# Patient Record
Sex: MALE | Race: WHITE | NOT HISPANIC OR LATINO
[De-identification: names, ages, dates, MRNs, and addresses within clinical notes are randomized per-mention and may not be internally consistent; named-entity substitution may affect disease eponyms.]

---

## 2024-09-12 ENCOUNTER — TRANSCRIPTION ENCOUNTER (OUTPATIENT)
Age: 74
End: 2024-09-12

## 2024-09-12 ENCOUNTER — RESULT REVIEW (OUTPATIENT)
Age: 74
End: 2024-09-12

## 2024-09-13 ENCOUNTER — TRANSCRIPTION ENCOUNTER (OUTPATIENT)
Age: 74
End: 2024-09-13

## 2024-09-13 ENCOUNTER — INPATIENT (INPATIENT)
Facility: HOSPITAL | Age: 74
LOS: 3 days | Discharge: HOME | End: 2024-09-17
Attending: STUDENT IN AN ORGANIZED HEALTH CARE EDUCATION/TRAINING PROGRAM | Admitting: STUDENT IN AN ORGANIZED HEALTH CARE EDUCATION/TRAINING PROGRAM
Payer: COMMERCIAL

## 2024-09-13 VITALS
OXYGEN SATURATION: 98 % | DIASTOLIC BLOOD PRESSURE: 84 MMHG | SYSTOLIC BLOOD PRESSURE: 126 MMHG | HEART RATE: 75 BPM | TEMPERATURE: 98 F | RESPIRATION RATE: 17 BRPM

## 2024-09-13 DIAGNOSIS — Z98.890 OTHER SPECIFIED POSTPROCEDURAL STATES: Chronic | ICD-10-CM

## 2024-09-13 DIAGNOSIS — I50.20 UNSPECIFIED SYSTOLIC (CONGESTIVE) HEART FAILURE: ICD-10-CM

## 2024-09-13 DIAGNOSIS — R74.8 ABNORMAL LEVELS OF OTHER SERUM ENZYMES: ICD-10-CM

## 2024-09-13 DIAGNOSIS — E78.5 HYPERLIPIDEMIA, UNSPECIFIED: ICD-10-CM

## 2024-09-13 DIAGNOSIS — N17.9 ACUTE KIDNEY FAILURE, UNSPECIFIED: ICD-10-CM

## 2024-09-13 DIAGNOSIS — R73.03 PREDIABETES: ICD-10-CM

## 2024-09-13 DIAGNOSIS — I10 ESSENTIAL (PRIMARY) HYPERTENSION: ICD-10-CM

## 2024-09-13 DIAGNOSIS — I25.10 ATHEROSCLEROTIC HEART DISEASE OF NATIVE CORONARY ARTERY WITHOUT ANGINA PECTORIS: ICD-10-CM

## 2024-09-13 LAB
ALBUMIN SERPL ELPH-MCNC: 4.6 G/DL — SIGNIFICANT CHANGE UP (ref 3.3–5)
ALP SERPL-CCNC: 69 U/L — SIGNIFICANT CHANGE UP (ref 40–120)
ALT FLD-CCNC: 137 U/L — HIGH (ref 10–45)
ANION GAP SERPL CALC-SCNC: 12 MMOL/L — SIGNIFICANT CHANGE UP (ref 5–17)
APTT BLD: 28.7 SEC — SIGNIFICANT CHANGE UP (ref 24.5–35.6)
AST SERPL-CCNC: 282 U/L — HIGH (ref 10–40)
BILIRUB SERPL-MCNC: 0.8 MG/DL — SIGNIFICANT CHANGE UP (ref 0.2–1.2)
BUN SERPL-MCNC: 28 MG/DL — HIGH (ref 7–23)
CALCIUM SERPL-MCNC: 10.1 MG/DL — SIGNIFICANT CHANGE UP (ref 8.4–10.5)
CHLORIDE SERPL-SCNC: 100 MMOL/L — SIGNIFICANT CHANGE UP (ref 96–108)
CO2 SERPL-SCNC: 27 MMOL/L — SIGNIFICANT CHANGE UP (ref 22–31)
CREAT SERPL-MCNC: 1.36 MG/DL — HIGH (ref 0.5–1.3)
EGFR: 55 ML/MIN/1.73M2 — LOW
GLUCOSE SERPL-MCNC: 119 MG/DL — HIGH (ref 70–99)
HCT VFR BLD CALC: 46.7 % — SIGNIFICANT CHANGE UP (ref 39–50)
HGB BLD-MCNC: 15.4 G/DL — SIGNIFICANT CHANGE UP (ref 13–17)
INR BLD: 0.97 — SIGNIFICANT CHANGE UP (ref 0.85–1.18)
LACTATE SERPL-SCNC: 1.7 MMOL/L — SIGNIFICANT CHANGE UP (ref 0.5–2)
MAGNESIUM SERPL-MCNC: 2.1 MG/DL — SIGNIFICANT CHANGE UP (ref 1.6–2.6)
MCHC RBC-ENTMCNC: 29.7 PG — SIGNIFICANT CHANGE UP (ref 27–34)
MCHC RBC-ENTMCNC: 33 GM/DL — SIGNIFICANT CHANGE UP (ref 32–36)
MCV RBC AUTO: 90 FL — SIGNIFICANT CHANGE UP (ref 80–100)
NRBC # BLD: 0 /100 WBCS — SIGNIFICANT CHANGE UP (ref 0–0)
PLATELET # BLD AUTO: 204 K/UL — SIGNIFICANT CHANGE UP (ref 150–400)
POTASSIUM SERPL-MCNC: 4 MMOL/L — SIGNIFICANT CHANGE UP (ref 3.5–5.3)
POTASSIUM SERPL-SCNC: 4 MMOL/L — SIGNIFICANT CHANGE UP (ref 3.5–5.3)
PROT SERPL-MCNC: 7.8 G/DL — SIGNIFICANT CHANGE UP (ref 6–8.3)
PROTHROM AB SERPL-ACNC: 11.1 SEC — SIGNIFICANT CHANGE UP (ref 9.5–13)
RBC # BLD: 5.19 M/UL — SIGNIFICANT CHANGE UP (ref 4.2–5.8)
RBC # FLD: 13.2 % — SIGNIFICANT CHANGE UP (ref 10.3–14.5)
SODIUM SERPL-SCNC: 139 MMOL/L — SIGNIFICANT CHANGE UP (ref 135–145)
WBC # BLD: 10.63 K/UL — HIGH (ref 3.8–10.5)
WBC # FLD AUTO: 10.63 K/UL — HIGH (ref 3.8–10.5)

## 2024-09-13 RX ORDER — ASPIRIN 81 MG
81 TABLET, DELAYED RELEASE (ENTERIC COATED) ORAL DAILY
Refills: 0 | Status: DISCONTINUED | OUTPATIENT
Start: 2024-09-13 | End: 2024-09-17

## 2024-09-13 RX ORDER — DAPAGLIFLOZIN 10 MG/1
1 TABLET, FILM COATED ORAL
Refills: 0 | DISCHARGE

## 2024-09-13 RX ORDER — METOPROLOL TARTRATE 100 MG/1
50 TABLET ORAL
Refills: 0 | Status: DISCONTINUED | OUTPATIENT
Start: 2024-09-13 | End: 2024-09-14

## 2024-09-13 RX ADMIN — METOPROLOL TARTRATE 50 MILLIGRAM(S): 100 TABLET ORAL at 22:35

## 2024-09-13 NOTE — H&P ADULT - PROBLEM SELECTOR PLAN 6
Hold Lipitor in setting of transaminitis     F: None  E: Replete if K<4 or Mag<2  N: DASH Diet  VTEppx: Start heparin gtt in AM   Dispo: Clinically active

## 2024-09-13 NOTE — H&P ADULT - PROBLEM SELECTOR PROBLEM 3
Elevated liver enzymes [place] Pregnancy And Lactation Text: This medication is Pregnancy Category X and contraindicated in pregnancy and in women who may become pregnant. It is unknown if this medication is excreted in breast milk.

## 2024-09-13 NOTE — H&P ADULT - NSICDXPASTMEDICALHX_GEN_ALL_CORE_FT
PAST MEDICAL HISTORY:  CAD (coronary artery disease)     Chronic HFrEF (heart failure with reduced ejection fraction)     HLD (hyperlipidemia)     HTN (hypertension)

## 2024-09-13 NOTE — H&P ADULT - PROBLEM/PLAN-3
Pt called stating she thought she was going to get an inhaler sent to the pharmacy    Also, she stated she was looking for the referral to sleep medicine for  A sleep apnea test  DISPLAY PLAN FREE TEXT

## 2024-09-13 NOTE — H&P ADULT - NSHPLABSRESULTS_GEN_ALL_CORE
15.4   10.63 )-----------( 204      ( 13 Sep 2024 21:31 )             46.7       09-13    139  |  100  |  x   ----------------------------<  119<H>  4.0   |  27  |  x     Ca    10.1      13 Sep 2024 21:31  Mg     2.1     09-13        PT/INR - ( 13 Sep 2024 21:32 )   PT: 11.1 sec;   INR: 0.97          PTT - ( 13 Sep 2024 21:32 )  PTT:28.7 sec          Urinalysis Basic - ( 13 Sep 2024 21:31 )    Color: x / Appearance: x / SG: x / pH: x  Gluc: 119 mg/dL / Ketone: x  / Bili: x / Urobili: x   Blood: x / Protein: x / Nitrite: x   Leuk Esterase: x / RBC: x / WBC x   Sq Epi: x / Non Sq Epi: x / Bacteria: x        ECG NSR @ 75 bpm w/ PVCs and RBBB

## 2024-09-13 NOTE — PATIENT PROFILE ADULT - VISION (WITH CORRECTIVE LENSES IF THE PATIENT USUALLY WEARS THEM):
recent cataract surgery from January/Normal vision: sees adequately in most situations; can see medication labels, newsprint

## 2024-09-13 NOTE — H&P ADULT - WEIGHT IN LBS
LOC: The patient is awake, alert, and oriented to self, place, time, and situation. Pt is calm and cooperative. Affect is appropriate.  Speech is appropriate and clear.     APPEARANCE: Patient resting comfortably in no acute distress.  Patient is clean and well groomed.    SKIN: The skin is warm and dry; color consistent with ethnicity.  Patient has normal skin turgor and moist mucus membranes. Wound noted to L shin, redness noted, no warmth noted, no swelling noted.     MUSCULOSKELETAL: Patient moving upper and lower extremities without difficulty; denies pain in the extremities or back.  Denies weakness.     RESPIRATORY: Airway is open and patent. Respirations spontaneous, even, easy, and non-labored.  Patient has a normal effort and rate.  No accessory muscle use noted. Denies cough.     CARDIAC:  Normal rate noted.  No peripheral edema noted. No complaints of chest pain.     ABDOMEN: Soft and non tender to palpation.  No distention noted. Pt denies abdominal pain; denies nausea, vomiting, diarrhea, or constipation.    NEUROLOGIC: Eyes open spontaneously.  Behavior appropriate to situation.  Follows commands; facial expression symmetrical.  Purposeful motor response noted; normal sensation in all extremities. Pt denies headache; denies lightheadedness or dizziness; denies visual disturbances; denies loss of balance; denies unilateral weakness.       198.8 Negative

## 2024-09-13 NOTE — H&P ADULT - HISTORY OF PRESENT ILLNESS
73yoM PMHx HTN and FamHx MI/HF (mother) and PAD(father) admitted to [facility] 9/12/24 for SOB and found to be AHRF 2/2 Acute CHF and HTN emergency w/ course c/b by troponemia and r/i NSTEMI resulting in tx to [facility] 9/12/24 for cardiac cath that revealed severe 3vCAD w/ subsequent tx to [facility] for CABG vs complex PCI vs medical management.     @ [place] on 9/12/24 he presented with sudden onset of progressively worsening severe left sided "gurgling" in the chest associated with dyspnea. No similar prior episodes. BIB EMS. On CXR bilateral vascular congestion. He met criteria on arrival there for htn emergency. He was placed on bipap for associated AHRF. He was managed medically with initiation of several cardiac medications including iv lasix, nitro drip, brillinta load, aspirin, high intensity statin, beta blocker, sglt2, hep drip. Troponin steadily began rising and reached peak in the 700s. Then was transferred him to [facility] for further cardiac intervention. @ [facility] 9/12/24 s/p cardiac cath that revealed severe 3vCAD. Currently denies CP, SOB, n/v/d, fever, chills, palpitations, dizziness, lightheadedness, abd pain, headache or diaphoresis. Pt does not have a cardiologist.     On admission   T 97.6, /84, HR 75, O2 98% on RA   WBC 1063, H/H 15.4/46.7, Lactate 1.7, Cr 1.36, ,   ECG NSR @ 75 bpm w/ PVCs and RBBB

## 2024-09-13 NOTE — H&P ADULT - PROBLEM SELECTOR PLAN 2
Warm, euvolemic on exam  - Lactate 1.7, ->282, ->137  - Etiology: ICM   - GDMT: C/w Lopressor 50 BID   - TTE (9/12/24): LVEF 17%, LV mod dilated, apical akinesis rest is severely hypokinetic, no LV thrombus, G3DD, mildly reduced RVSF, LA mildly dilated, mild AR, mod-severe MR    Plan  - Advanced HF consult (Ishmael aware of pt)  - Holding Farxiga

## 2024-09-13 NOTE — PATIENT PROFILE ADULT - STATED REASON FOR ADMISSION
[facility] scheduled procedure for cardiac cath, transferred today for further work up

## 2024-09-13 NOTE — H&P ADULT - PROBLEM SELECTOR PLAN 1
Currently CP free, VSS  - S/p LHC at [facility] (9/13/24): Diffuse 3vCAD (100%mLAD, 100% D1, 100% D2, 90% OM2, 100% mRCA receives retrograde collaterals), LVEDP 35 mmHg. No AS. right femoral access.  - Trop at [facility] [phone]  - Loaded with Aspirin 325mg PO x 1, Ticagrelor 300mg PO X 1 at [place] 9/12/24  - ECG NSR @ 75 bpm w/ PVCs and RBBB    Plan  - Consult CTS for CABG vs high risk PCI vs medical management   - C/w aspirin 81mg  - Start heparin gtt in AM   - Hold Brilinta pending CTS decision and lipitor in setting of transaminitis

## 2024-09-13 NOTE — PATIENT PROFILE ADULT - FALL HARM RISK - HARM RISK INTERVENTIONS
Assistance with ambulation/Assistance OOB with selected safe patient handling equipment/Communicate Risk of Fall with Harm to all staff/Monitor gait and stability/Reinforce activity limits and safety measures with patient and family/Sit up slowly, dangle for a short time, stand at bedside before walking/Tailored Fall Risk Interventions/Toileting schedule using arm’s reach rule for commode and bathroom/Use of alarms - bed, chair and/or voice tab/Visual Cue: Yellow wristband and red socks/Bed in lowest position, wheels locked, appropriate side rails in place/Call bell, personal items and telephone in reach/Instruct patient to call for assistance before getting out of bed or chair/Non-slip footwear when patient is out of bed/[place] to call system/Physically safe environment - no spills, clutter or unnecessary equipment/Purposeful Proactive Rounding/Room/bathroom lighting operational, light cord in reach

## 2024-09-13 NOTE — H&P ADULT - ASSESSMENT
73yoM PMHx HTN and FamHx MI/HF (mother) and PAD(father) admitted to [facility] 9/12/24 for SOB and found to be AHRF 2/2 Acute CHF and HTN emergency w/ course c/b by troponemia and r/i NSTEMI resulting in tx to [facility] 9/12/24 for cardiac cath that revealed severe 3vCAD. Pt now transferred to [facility] for CABG vs complex PCI vs medical management.

## 2024-09-14 ENCOUNTER — RESULT REVIEW (OUTPATIENT)
Age: 74
End: 2024-09-14

## 2024-09-14 DIAGNOSIS — I21.4 NON-ST ELEVATION (NSTEMI) MYOCARDIAL INFARCTION: ICD-10-CM

## 2024-09-14 LAB
A1C WITH ESTIMATED AVERAGE GLUCOSE RESULT: 5.8 % — HIGH (ref 4–5.6)
ADD ON TEST-SPECIMEN IN LAB: SIGNIFICANT CHANGE UP
ALBUMIN SERPL ELPH-MCNC: 4.4 G/DL — SIGNIFICANT CHANGE UP (ref 3.3–5)
ALP SERPL-CCNC: 63 U/L — SIGNIFICANT CHANGE UP (ref 40–120)
ALT FLD-CCNC: 115 U/L — HIGH (ref 10–45)
ANION GAP SERPL CALC-SCNC: 13 MMOL/L — SIGNIFICANT CHANGE UP (ref 5–17)
APTT BLD: 65.2 SEC — HIGH (ref 24.5–35.6)
APTT BLD: 92.1 SEC — HIGH (ref 24.5–35.6)
AST SERPL-CCNC: 205 U/L — HIGH (ref 10–40)
BILIRUB SERPL-MCNC: 1.1 MG/DL — SIGNIFICANT CHANGE UP (ref 0.2–1.2)
BUN SERPL-MCNC: 32 MG/DL — HIGH (ref 7–23)
CALCIUM SERPL-MCNC: 10 MG/DL — SIGNIFICANT CHANGE UP (ref 8.4–10.5)
CHLORIDE SERPL-SCNC: 102 MMOL/L — SIGNIFICANT CHANGE UP (ref 96–108)
CHOLEST SERPL-MCNC: 233 MG/DL — HIGH
CK MB CFR SERPL CALC: 33.3 NG/ML — HIGH (ref 0–6.7)
CK SERPL-CCNC: 701 U/L — HIGH (ref 30–200)
CO2 SERPL-SCNC: 23 MMOL/L — SIGNIFICANT CHANGE UP (ref 22–31)
CREAT SERPL-MCNC: 1.14 MG/DL — SIGNIFICANT CHANGE UP (ref 0.5–1.3)
EGFR: 68 ML/MIN/1.73M2 — SIGNIFICANT CHANGE UP
ESTIMATED AVERAGE GLUCOSE: 120 MG/DL — HIGH (ref 68–114)
GLUCOSE SERPL-MCNC: 124 MG/DL — HIGH (ref 70–99)
HCT VFR BLD CALC: 45.4 % — SIGNIFICANT CHANGE UP (ref 39–50)
HCT VFR BLD CALC: 46.8 % — SIGNIFICANT CHANGE UP (ref 39–50)
HDLC SERPL-MCNC: 60 MG/DL — SIGNIFICANT CHANGE UP
HGB BLD-MCNC: 15.3 G/DL — SIGNIFICANT CHANGE UP (ref 13–17)
HGB BLD-MCNC: 15.5 G/DL — SIGNIFICANT CHANGE UP (ref 13–17)
LIPID PNL WITH DIRECT LDL SERPL: 142 MG/DL — HIGH
MAGNESIUM SERPL-MCNC: 2 MG/DL — SIGNIFICANT CHANGE UP (ref 1.6–2.6)
MCHC RBC-ENTMCNC: 30.3 PG — SIGNIFICANT CHANGE UP (ref 27–34)
MCHC RBC-ENTMCNC: 30.9 PG — SIGNIFICANT CHANGE UP (ref 27–34)
MCHC RBC-ENTMCNC: 32.7 GM/DL — SIGNIFICANT CHANGE UP (ref 32–36)
MCHC RBC-ENTMCNC: 34.1 GM/DL — SIGNIFICANT CHANGE UP (ref 32–36)
MCV RBC AUTO: 88.7 FL — SIGNIFICANT CHANGE UP (ref 80–100)
MCV RBC AUTO: 94.5 FL — SIGNIFICANT CHANGE UP (ref 80–100)
NON HDL CHOLESTEROL: 173 MG/DL — HIGH
NRBC # BLD: 0 /100 WBCS — SIGNIFICANT CHANGE UP (ref 0–0)
NRBC # BLD: 0 /100 WBCS — SIGNIFICANT CHANGE UP (ref 0–0)
NT-PROBNP SERPL-SCNC: 5047 PG/ML — HIGH (ref 0–300)
PLATELET # BLD AUTO: 184 K/UL — SIGNIFICANT CHANGE UP (ref 150–400)
PLATELET # BLD AUTO: 204 K/UL — SIGNIFICANT CHANGE UP (ref 150–400)
POTASSIUM SERPL-MCNC: 3.8 MMOL/L — SIGNIFICANT CHANGE UP (ref 3.5–5.3)
POTASSIUM SERPL-SCNC: 3.8 MMOL/L — SIGNIFICANT CHANGE UP (ref 3.5–5.3)
PROT SERPL-MCNC: 7.6 G/DL — SIGNIFICANT CHANGE UP (ref 6–8.3)
RBC # BLD: 4.95 M/UL — SIGNIFICANT CHANGE UP (ref 4.2–5.8)
RBC # BLD: 5.12 M/UL — SIGNIFICANT CHANGE UP (ref 4.2–5.8)
RBC # FLD: 13.1 % — SIGNIFICANT CHANGE UP (ref 10.3–14.5)
RBC # FLD: 13.2 % — SIGNIFICANT CHANGE UP (ref 10.3–14.5)
SODIUM SERPL-SCNC: 138 MMOL/L — SIGNIFICANT CHANGE UP (ref 135–145)
TRIGL SERPL-MCNC: 173 MG/DL — HIGH
TROPONIN T, HIGH SENSITIVITY RESULT: 5999 NG/L — CRITICAL HIGH (ref 0–51)
WBC # BLD: 11.2 K/UL — HIGH (ref 3.8–10.5)
WBC # BLD: 9.99 K/UL — SIGNIFICANT CHANGE UP (ref 3.8–10.5)
WBC # FLD AUTO: 11.2 K/UL — HIGH (ref 3.8–10.5)
WBC # FLD AUTO: 9.99 K/UL — SIGNIFICANT CHANGE UP (ref 3.8–10.5)

## 2024-09-14 PROCEDURE — 99223 1ST HOSP IP/OBS HIGH 75: CPT

## 2024-09-14 PROCEDURE — 99233 SBSQ HOSP IP/OBS HIGH 50: CPT

## 2024-09-14 PROCEDURE — 71045 X-RAY EXAM CHEST 1 VIEW: CPT | Mod: 26

## 2024-09-14 PROCEDURE — 93306 TTE W/DOPPLER COMPLETE: CPT | Mod: 26

## 2024-09-14 RX ORDER — METOPROLOL TARTRATE 100 MG/1
50 TABLET ORAL DAILY
Refills: 0 | Status: DISCONTINUED | OUTPATIENT
Start: 2024-09-15 | End: 2024-09-16

## 2024-09-14 RX ORDER — TICAGRELOR 90 MG/1
90 TABLET ORAL EVERY 12 HOURS
Refills: 0 | Status: DISCONTINUED | OUTPATIENT
Start: 2024-09-14 | End: 2024-09-17

## 2024-09-14 RX ORDER — FUROSEMIDE 40 MG
40 TABLET ORAL DAILY
Refills: 0 | Status: DISCONTINUED | OUTPATIENT
Start: 2024-09-14 | End: 2024-09-16

## 2024-09-14 RX ORDER — FUROSEMIDE 40 MG
40 TABLET ORAL DAILY
Refills: 0 | Status: DISCONTINUED | OUTPATIENT
Start: 2024-09-14 | End: 2024-09-14

## 2024-09-14 RX ORDER — HEPARIN SODIUM,BOVINE 1000/ML
7000 VIAL (ML) INJECTION EVERY 6 HOURS
Refills: 0 | Status: DISCONTINUED | OUTPATIENT
Start: 2024-09-14 | End: 2024-09-15

## 2024-09-14 RX ORDER — SPIRONOLACTONE 25 MG/1
25 TABLET, FILM COATED ORAL DAILY
Refills: 0 | Status: DISCONTINUED | OUTPATIENT
Start: 2024-09-14 | End: 2024-09-17

## 2024-09-14 RX ORDER — SACUBITRIL AND VALSARTAN 49; 51 MG/1; MG/1
1 TABLET, FILM COATED ORAL
Refills: 0 | Status: DISCONTINUED | OUTPATIENT
Start: 2024-09-14 | End: 2024-09-16

## 2024-09-14 RX ORDER — POTASSIUM CHLORIDE 10 MEQ
40 TABLET, EXT RELEASE, PARTICLES/CRYSTALS ORAL ONCE
Refills: 0 | Status: COMPLETED | OUTPATIENT
Start: 2024-09-14 | End: 2024-09-14

## 2024-09-14 RX ORDER — HEPARIN SODIUM,BOVINE 1000/ML
VIAL (ML) INJECTION
Qty: 25000 | Refills: 0 | Status: DISCONTINUED | OUTPATIENT
Start: 2024-09-14 | End: 2024-09-15

## 2024-09-14 RX ORDER — HEPARIN SODIUM,BOVINE 1000/ML
7000 VIAL (ML) INJECTION ONCE
Refills: 0 | Status: COMPLETED | OUTPATIENT
Start: 2024-09-14 | End: 2024-09-14

## 2024-09-14 RX ORDER — HEPARIN SODIUM,BOVINE 1000/ML
3500 VIAL (ML) INJECTION EVERY 6 HOURS
Refills: 0 | Status: DISCONTINUED | OUTPATIENT
Start: 2024-09-14 | End: 2024-09-15

## 2024-09-14 RX ADMIN — SACUBITRIL AND VALSARTAN 1 TABLET(S): 49; 51 TABLET, FILM COATED ORAL at 17:43

## 2024-09-14 RX ADMIN — Medication 5 MILLIGRAM(S): at 22:07

## 2024-09-14 RX ADMIN — Medication 1600 UNIT(S)/HR: at 08:13

## 2024-09-14 RX ADMIN — Medication 40 MILLIGRAM(S): at 11:52

## 2024-09-14 RX ADMIN — Medication 81 MILLIGRAM(S): at 11:52

## 2024-09-14 RX ADMIN — Medication 5 MILLIGRAM(S): at 01:55

## 2024-09-14 RX ADMIN — Medication 1600 UNIT(S)/HR: at 07:38

## 2024-09-14 RX ADMIN — Medication 1600 UNIT(S)/HR: at 14:31

## 2024-09-14 RX ADMIN — Medication 7000 UNIT(S): at 07:38

## 2024-09-14 RX ADMIN — Medication 1600 UNIT(S)/HR: at 21:10

## 2024-09-14 RX ADMIN — SPIRONOLACTONE 25 MILLIGRAM(S): 25 TABLET, FILM COATED ORAL at 11:53

## 2024-09-14 RX ADMIN — TICAGRELOR 90 MILLIGRAM(S): 90 TABLET ORAL at 11:11

## 2024-09-14 RX ADMIN — Medication 40 MILLIEQUIVALENT(S): at 11:52

## 2024-09-14 RX ADMIN — TICAGRELOR 90 MILLIGRAM(S): 90 TABLET ORAL at 22:07

## 2024-09-14 RX ADMIN — METOPROLOL TARTRATE 50 MILLIGRAM(S): 100 TABLET ORAL at 06:15

## 2024-09-14 RX ADMIN — Medication 1600 UNIT(S)/HR: at 20:07

## 2024-09-14 NOTE — PROVIDER CONTACT NOTE (CRITICAL VALUE NOTIFICATION) - BACKGROUND
Patient had NSTEMI, s/p cardiac cath, revealed severe triple vessel CAD, also has HF. Has Heparin gtt 16ml/hr (therapeutic APTT)

## 2024-09-14 NOTE — CONSULT NOTE ADULT - SUBJECTIVE AND OBJECTIVE BOX
HPI: 73yoM PMHx HTN and FamHx MI/HF (mother) and PAD(father) admitted to [facility] 9/12/24 for SOB and found to be AHRF 2/2 Acute CHF and HTN emergency w/ course c/b by troponemia and r/i NSTEMI resulting in tx to [facility] 9/12/24 for cardiac cath that revealed severe 3vCAD w/ subsequent tx to [facility] for CABG vs complex PCI vs medical management.   @ [place] on 9/12/24 he presented with sudden onset of progressively worsening severe left sided "gurgling" in the chest associated with dyspnea. No similar prior episodes. BIB EMS. On CXR bilateral vascular congestion. He met criteria on arrival there for htn emergency. He was placed on bipap for associated AHRF. He was managed medically with initiation of several cardiac medications including iv lasix, nitro drip, brillinta load, aspirin, high intensity statin, beta blocker, sglt2, hep drip. Troponin steadily began rising and reached peak in the 700s. Then was transferred him to [facility] for further cardiac intervention. @ [facility] 9/12/24 s/p cardiac cath that revealed severe 3vCAD, no interventions performed and patient transferred to [facility] for CABG vs complex PCI vs medical management. Per attending to attending d/w CTS Dr. Almonte, patient deemed not a surgical candidate as he has no appropriate targets. TTE done showing severely reduced LVSF with EF 15-20% for which heart failure consulted.    SUBJECTIVE / INTERVAL HPI: Patient seen and examined at bedside. Reports he is feeling better and denies any current SOB or chest pain. Denies any known history of CHF, CAD or MI prior to current admission. Does not follow with a cardiologist as outpatient. Reports morning of admission he felt a "gurgling" sensation in his chest with SOB however has never experienced this before and denies any SOB, MENJIVAR or chest pain prior to day of admission. No LE edema either.     PHYSICAL EXAM:    General: lying in bed in NAD  HEENT: NC/AT   Neck: supple, mild +JVD   Cardiovascular: +S1/S2, RRR, no murmurs   Respiratory: CTA B/L; no W/R/C  Extremities: WWP; no LE edema   Neurological: AAOx3    VITAL SIGNS:  Vital Signs Last 24 Hrs  T(C): 37.2 (14 Sep 2024 20:32), Max: 37.2 (14 Sep 2024 20:32)  T(F): 99 (14 Sep 2024 20:32), Max: 99 (14 Sep 2024 20:32)  HR: 80 (14 Sep 2024 20:32) (67 - 80)  BP: 110/74 (14 Sep 2024 20:32) (104/71 - 142/74)  BP(mean): 87 (14 Sep 2024 20:32) (87 - 87)  RR: 19 (14 Sep 2024 20:32) (17 - 19)  SpO2: 97% (14 Sep 2024 20:32) (95% - 97%)    Parameters below as of 14 Sep 2024 20:32  Patient On (Oxygen Delivery Method): room air          MEDICATIONS:  MEDICATIONS  (STANDING):  aspirin enteric coated 81 milliGRAM(s) Oral daily  furosemide    Tablet 40 milliGRAM(s) Oral daily  heparin  Infusion.  Unit(s)/Hr (16 mL/Hr) IV Continuous <Continuous>  melatonin 5 milliGRAM(s) Oral at bedtime  sacubitril 24 mG/valsartan 26 mG 1 Tablet(s) Oral two times a day  spironolactone 25 milliGRAM(s) Oral daily  ticagrelor 90 milliGRAM(s) Oral every 12 hours    MEDICATIONS  (PRN):  heparin   Injectable 7000 Unit(s) IV Push every 6 hours PRN For aPTT less than 40  heparin   Injectable 3500 Unit(s) IV Push every 6 hours PRN For aPTT between 40 - 57      ALLERGIES:  Allergies    No Known Allergies    Intolerances        LABS:                        15.5   11.20 )-----------( 204      ( 14 Sep 2024 13:40 )             45.4     09-14    138  |  102  |  32<H>  ----------------------------<  124<H>  3.8   |  23  |  1.14    Ca    10.0      14 Sep 2024 07:45  Mg     2.0     09-14    TPro  7.6  /  Alb  4.4  /  TBili  1.1  /  DBili  x   /  AST  205<H>  /  ALT  115<H>  /  AlkPhos  63  09-14    PT/INR - ( 13 Sep 2024 21:32 )   PT: 11.1 sec;   INR: 0.97          PTT - ( 14 Sep 2024 20:02 )  PTT:65.2 sec  Urinalysis Basic - ( 14 Sep 2024 07:45 )    Color: x / Appearance: x / SG: x / pH: x  Gluc: 124 mg/dL / Ketone: x  / Bili: x / Urobili: x   Blood: x / Protein: x / Nitrite: x   Leuk Esterase: x / RBC: x / WBC x   Sq Epi: x / Non Sq Epi: x / Bacteria: x      CAPILLARY BLOOD GLUCOSE          RADIOLOGY & ADDITIONAL TESTS: Reviewed.

## 2024-09-14 NOTE — PROGRESS NOTE ADULT - PROBLEM SELECTOR PLAN 4
·  Plan: Hold Lipitor in setting of transaminitis Chol: 233, Tri: 173, HDL: 60, LDL: 142.   --Statin and Farxiga on HOLD 2/2 transaminitis.

## 2024-09-14 NOTE — PROGRESS NOTE ADULT - PROBLEM SELECTOR PLAN 6
F/u AM Cr  - Cr 1.08-> 1.36  - Avoid nephrotoxic meds, renally dose meds when possible. BUN/Cr 28/1.38 upon arrival to [facility].  --Cr 1.14 this am.  --will F/U UA and urine studies.      N: DASH with 1 liter fluid restriction  E: Maintain K>4.0 and Mg>2.0  VTE PPx: on Heparin gtt  Code: Full

## 2024-09-14 NOTE — PROGRESS NOTE ADULT - SUBJECTIVE AND OBJECTIVE BOX
Cardiology PA Adult Progress Note      Patient seen and examined at bedside resting comfortably. Patient states his breathing has significantly improved since hospitalization. Patient denies any chest pain, palpitations, dizziness or right groin access site pain.       	  MEDICATIONS:  furosemide    Tablet 40 milliGRAM(s) Oral daily  metoprolol tartrate 50 milliGRAM(s) Oral two times a day  spironolactone 25 milliGRAM(s) Oral daily  aspirin enteric coated 81 milliGRAM(s) Oral daily  heparin  Infusion.  Unit(s)/Hr IV Continuous <Continuous>  potassium chloride    Tablet ER 40 milliEquivalent(s) Oral once      	    [PHYSICAL EXAM:  TELEMETRY:  T(C): 36.6 (09-14-24 @ 08:57), Max: 36.8 (09-14-24 @ 05:33)  HR: 67 (09-14-24 @ 08:57) (67 - 80)  BP: 104/71 (09-14-24 @ 08:57) (104/71 - 142/74)  RR: 18 (09-14-24 @ 08:57) (16 - 18)  SpO2: 95% (09-14-24 @ 08:57) (95% - 98%)        Appearance: well appearing elderly male in NAD  HEENT:   NC/AT, moist mucous membranes	  Neck: Supple, no JVD  Cardiovascular: [id number], IV/VI +JEANNIE  Respiratory: Faint bibasilar crackles noted  Gastrointestinal:  + BS, soft, NT/ND  Extremities: warm well perfused, trace pedal edema bilaterally  Vascular: Right groin access site soft, NT, no hematoma/bruit, right DP/PT 2+  Neuro: no focal neurodeficits      	        LABS:	 	                        15.3   9.99  )-----------( 184      ( 14 Sep 2024 07:45 )             46.8     09-14    138  |  102  |  32<H>  ----------------------------<  124<H>  3.8   |  23  |  1.14    Ca    10.0      14 Sep 2024 07:45  Mg     2.0     09-14    TPro  7.6  /  Alb  4.4  /  TBili  1.1  /  DBili  x   /  AST  205<H>  /  ALT  115<H>  /  AlkPhos  63  09-14 Cardiology PA Adult Progress Note      Patient seen and examined at bedside resting comfortably. Patient states his breathing has significantly improved since hospitalization. Patient denies any chest pain, palpitations, dizziness or right groin access site pain.       	  MEDICATIONS:  furosemide    Tablet 40 milliGRAM(s) Oral daily  metoprolol tartrate 50 milliGRAM(s) Oral two times a day  spironolactone 25 milliGRAM(s) Oral daily  aspirin enteric coated 81 milliGRAM(s) Oral daily  heparin  Infusion.  Unit(s)/Hr IV Continuous <Continuous>  potassium chloride    Tablet ER 40 milliEquivalent(s) Oral once      	    [PHYSICAL EXAM:  TELEMETRY:  T(C): 36.6 (09-14-24 @ 08:57), Max: 36.8 (09-14-24 @ 05:33)  HR: 67 (09-14-24 @ 08:57) (67 - 80)  BP: 104/71 (09-14-24 @ 08:57) (104/71 - 142/74)  RR: 18 (09-14-24 @ 08:57) (16 - 18)  SpO2: 95% (09-14-24 @ 08:57) (95% - 98%)        Appearance: well appearing elderly male in NAD  HEENT:   NC/AT, moist mucous membranes	  Neck: Supple, + JVD  Cardiovascular: [id number], IV/VI +JEANNIE  Respiratory: Faint bibasilar crackles noted  Gastrointestinal:  + BS, soft, NT/ND  Extremities: warm well perfused, trace pedal edema bilaterally  Vascular: Right groin access site soft, NT, no hematoma/bruit, right DP/PT 2+  Neuro: no focal neurodeficits      	        LABS:	 	                        15.3   9.99  )-----------( 184      ( 14 Sep 2024 07:45 )             46.8     09-14    138  |  102  |  32<H>  ----------------------------<  124<H>  3.8   |  23  |  1.14    Ca    10.0      14 Sep 2024 07:45  Mg     2.0     09-14    TPro  7.6  /  Alb  4.4  /  TBili  1.1  /  DBili  x   /  AST  205<H>  /  ALT  115<H>  /  AlkPhos  63  09-14 Cardiology PA Adult Progress Note      Patient seen and examined at bedside resting comfortably. Patient states his breathing has significantly improved since hospitalization. Patient denies any chest pain, palpitations, dizziness or right groin access site pain.       	  MEDICATIONS:  furosemide    Tablet 40 milliGRAM(s) Oral daily  metoprolol tartrate 50 milliGRAM(s) Oral two times a day  spironolactone 25 milliGRAM(s) Oral daily  aspirin enteric coated 81 milliGRAM(s) Oral daily  heparin  Infusion.  Unit(s)/Hr IV Continuous <Continuous>  potassium chloride    Tablet ER 40 milliEquivalent(s) Oral once      	    [PHYSICAL EXAM:  TELEMETRY:  T(C): 36.6 (09-14-24 @ 08:57), Max: 36.8 (09-14-24 @ 05:33)  HR: 67 (09-14-24 @ 08:57) (67 - 80)  BP: 104/71 (09-14-24 @ 08:57) (104/71 - 142/74)  RR: 18 (09-14-24 @ 08:57) (16 - 18)  SpO2: 95% (09-14-24 @ 08:57) (95% - 98%)        Appearance: well appearing elderly male in NAD  HEENT:   NC/AT, moist mucous membranes	  Neck: Supple, + JVD  Cardiovascular: [id number], IV/VI +JEANNIE  Respiratory: Faint bibasilar crackles noted  Gastrointestinal:  + BS, soft, NT/ND  Extremities: warm well perfused, trace pedal edema bilaterally  Vascular: Right groin access site soft, NT, no hematoma/bruit, right DP/PT 2+  Neuro: no focal neurodeficits      	        LABS:	 	                        15.3   9.99  )-----------( 184      ( 14 Sep 2024 07:45 )             46.8     09-14    138  |  102  |  32<H>  ----------------------------<  124<H>  3.8   |  23  |  1.14    Ca    10.0      14 Sep 2024 07:45  Mg     2.0     09-14    TPro  7.6  /  Alb  4.4  /  TBili  1.1  /  DBili  x   /  AST  205<H>  /  ALT  115<H>  /  AlkPhos  63  09-14

## 2024-09-14 NOTE — CONSULT NOTE ADULT - ASSESSMENT
73yoM PMHx HTN and FamHx MI/HF (mother) and PAD(father) admitted to [facility] 9/12/24 for SOB and found to be AHRF 2/2 Acute CHF and HTN emergency w/ course c/b by troponemia and r/i NSTEMI resulting in tx to [facility] 9/12/24 for cardiac cath that revealed severe 3vCAD w/ subsequent tx to [facility] for CABG vs complex PCI vs medical management. TTE done showing severely reduced LVSF with EF 15-20% for which heart failure consulted.    Review of studies:   TTE 9/14/24: Severely reduced left ventricular systolic function, ejection fraction is 15-20%. Multiple segmental abnormalities exist. See findings. With echocontrast imaging, there is no evidence of left ventricular thrombus. Severely dilated left ventricular size. Grade III left ventricular diastolic dysfunction. Normal right ventricular size. Mildly reduced right ventricular systolic function. No pericardial effusion. The inferior vena cava is normal in size (<2.1cm) with abnormal inspiratory collapse (<50%) consistent with mildly elevated right atrial pressure (8, range 5-10mmHg).  TTE 9/12/24: Left ventricular cavity is moderately dilated. Left ventricular systolic function is severely decreased with an ejection fraction of 17 % by Vegas's method of disks. Apical akinesis, rest of LV is severely hypokinetic. No LV thrombus seen with Echo contrast. There is severe (grade 3) left ventricular diastolic dysfunction, with elevated left ventricular filling pressure. Normal right ventricular cavity size and mildly reduced right ventricular systolic function. Left atrium is mildly dilated. Mild aortic regurgitation. Moderate to severe mitral regurgitation. Mild tricuspid regurgitation. No pericardial effusion seen.  LHC at [facility] (9/13/24): Diffuse 3vCAD (100%mLAD, 100% D1, 100% D2, 90% OM2, 100% mRCA receives retrograde collaterals), LVEDP 35 mmHg    Recommendations:    #HFrEF  #acute systolic and diastolic CHF  -TTE showing reduced EF 15-20%, patient denies known history of HF and per patient has been asymptomatic prior to day of current admission. Patient presented with AHRF 2/2 pulmonary edema and volume overload in setting of acute decompensated HF from NSTEMI. S/p diuresis with IV lasix at OSH prior to transfer with improvement in respiratory and volume status.  Etiology: ischemic CM given severe 3vCAD on LHC with RWMAs on TTE   GDMT:   -c/w toprol 50mg qd   -c/w spironolactone 25mg qd   -c/w entresto 24-26mg BID  -once patient's LFTs improved would restart farxiga 10mg qd   Diuresis: patient mildly overloaded on exam today (as above presented with AHRF 2/2 pulmonary edema and volume overload now s/p IV diuresis with improvement)   -would continue with PO lasix 40mg qd   Valves: no significant valvular disease   AT: premature   Device: premature     #NSTEMI   #severe 3vCAD  -patient presenting to OSH with NSTEMI with LHC showing diffuse 3vCAD (100%mLAD, 100% D1, 100% D2, 90% OM2, 100% mRCA receives retrograde collaterals)  -case discussed with CT surgery, patient not CABG candidate   -pending possible complex PCI however likely no good targets   -c/w aspirin , Brilinta and heparin drip for management of NSTEMI   -high dose statin   -trend trops to peak     Please ensure patient has outpatient follow up with heart failure clinic upon discharge. Please email for appointment.     Recommendations discussed with HF attending Dr. Quiñones

## 2024-09-14 NOTE — CONSULT NOTE ADULT - ATTENDING COMMENTS
72 yo man was asymptomatic until this last week. Came to [facility] with dyspnea, orthopnea and LE edema. Diuresed, sent to [facility] where he underwent LHC that found severe MVCAD. LVEF 15%, grade III diastolic dysfunction. Surgery deemed there are no targets, and I think there are no PCI targets either. He is on low dose GDMT and based on what the fellow told me close to euvolemia. Troponin is still elevated but CPK is downtrending and he is CP free.    - Continue Entresto low dose, Toprol 50 mg, Spironolactone 25 mg   - Lasix 40 mg PO daily   - ASA and Ticagrelor  - Needs close HF follow-up    Fox Jordan MD

## 2024-09-14 NOTE — PROGRESS NOTE ADULT - PROBLEM SELECTOR PLAN 2
·  Plan: Warm, euvolemic on exam  - Lactate 1.7, ->282, ->137  - Etiology: ICM   - GDMT: C/w Lopressor 50 BID   - TTE (9/12/24): LVEF 17%, LV mod dilated, apical akinesis rest is severely hypokinetic, no LV thrombus, G3DD, mildly reduced RVSF, LA mildly dilated, mild AR, mod-severe MR    Plan  - Advanced HF consult (Ishmael aware of pt)  - Holding Farxiga. warm, +JVD, faint bibasilar crackles and trace bilateral LE edema.  - Lactate 1.7, ->282, ->137    - GDMT: C/w Lopressor 50 BID   - TTE (9/12/24): LVEF 17%, LV mod dilated, apical akinesis rest is severely hypokinetic, no LV thrombus, G3DD, mildly reduced RVSF, LA mildly dilated, mild AR, mod-severe MR    Plan  - Advanced HF consult (Ishmael aware of pt)  - Holding Farxiga. warm, +JVD, faint bibasilar crackles and trace bilateral LE edema.  - Lactate 1.7 upon arrival, LFTs downtrending ->282->205, ->137 ->115.  - DIURESIS: continue Lasix 40mg PO daily.  - GDMT: C/w Lo 50 BID   - Advanced HF consult (Ishmael aware of pt)  - Holding Farxiga.      ***TTE (9/12/24): LVEF 17%, LV mod dilated, apical akinesis rest is severely hypokinetic, no LV thrombus, G3DD, mildly reduced RVSF, LA mildly dilated, mild AR, mod-severe MR warm, +JVD, faint bibasilar crackles and trace bilateral LE edema.  --Lactate 1.7 upon arrival, LFTs downtrending ->282->205, ->137 ->115.  --DIURESIS: continue Lasix 40mg PO daily.  --GDMT: switch Lopressor to Toprol XL 50mg PO daily, start Spironolactone 25mg PO daily and Entresto 24/26mg PO q 12hrs.  Holding Farxiga in setting of transaminitis.  --CORE MEASURES: strict I/Os and daily weights.  --HF team consulted (case known to Dr. Quiñones per transfer sign out).      ***TTE@[facility] (9/12/24): LVEF 17%, LV mod dilated, apical akinesis rest is severely hypokinetic, no LV thrombus, G3DD, mildly reduced RVSF, LA mildly dilated, mild AR, mod-severe MR

## 2024-09-14 NOTE — PROGRESS NOTE ADULT - PROBLEM SELECTOR PLAN 1
currently chest pain free, hemodynamically stable.  - S/p LHC at [facility] (9/13/24): Diffuse 3vCAD (100%mLAD, 100% D1, 100% D2, 90% OM2, 100% mRCA receives retrograde collaterals), LVEDP 35 mmHg.   - Trop at [facility] [phone] ->[phone]  - Loaded with Aspirin 325mg PO x 1, Ticagrelor 180mg at [facility] 9/12/24  - ECG NSR @ 75 bpm w/ PVCs and RBBB  - Consult CTS for CABG vs high risk PCI vs medical management   - C/w aspirin 81mg  - Start heparin gtt in AM   - Hold Brilinta pending CTS decision and lipitor in setting of transaminitis. currently chest pain free, hemodynamically stable.  - ECG NSR @ 75 bpm w/ PVCs and RBBB.  - Trop at [facility] [phone] ->[phone].  - Loaded with Aspirin 325mg PO x 1, Ticagrelor 180mg at [facility] 9/12/24.  - S/p LHC at [facility] (9/13/24): Diffuse 3vCAD (100%mLAD, 100% D1, 100% D2, 90% OM2, 100% mRCA receives retrograde collaterals), LVEDP 35 mmHg.   - Per attending to attending d/w CTS Dr. Almonte, patient deemed not a surgical candidate as he has no appropriate targets. Plan for PCI vs medical management.  - will continue ASA/Brilinta/BB, Statin on HOLD 2/2 transaminitis. Will continue Heparin gtt until completion of 48 hours. currently chest pain free, hemodynamically stable.  --ECG NSR @ 75 bpm w/ PVCs and RBBB. Trop at [facility] 2183 ->3246.  --S/p LHC at [facility] (9/13/24): Diffuse 3vCAD (100%mLAD, 100% D1, 100% D2, 90% OM2, 100% mRCA receives retrograde collaterals), LVEDP 35 mmHg.   --Per attending to attending d/w CTS Dr. Almonte, patient deemed not a surgical candidate as he has no appropriate targets.   --Will obtain CMRI with viability, then plan for PCI vs medical management.  --Continue ASA/Brilinta/BB, Statin on HOLD 2/2 transaminitis. Will continue Heparin gtt until completion of 48 hours.

## 2024-09-14 NOTE — PROGRESS NOTE ADULT - ASSESSMENT
73 yr old M with FHx of heart disease, PMHx of HTN initially admitted to [facility] 9/12/24 for SOB and found to be AHRF 2/2 Acute CHF and HTN emergency w/ course c/b by troponemia and r/i NSTEMI resulting in TX to [facility] 9/12/24 for cardiac cath that revealed severe 3vCAD. Pt now transferred to [facility] for complex PCI vs medical management.

## 2024-09-14 NOTE — PROGRESS NOTE ADULT - PROBLEM SELECTOR PLAN 5
Liver Enzymes as above, downtrending   - F/u AM labs  - Hold statin. likely in setting of cardiogenic shock@[facility], now downtrending.  -- ->282->205, ->137 ->115.  -- continue to avoid hepatotoxic agents.

## 2024-09-15 LAB
ADD ON TEST-SPECIMEN IN LAB: SIGNIFICANT CHANGE UP
ALBUMIN SERPL ELPH-MCNC: 4.4 G/DL — SIGNIFICANT CHANGE UP (ref 3.3–5)
ALP SERPL-CCNC: 66 U/L — SIGNIFICANT CHANGE UP (ref 40–120)
ALT FLD-CCNC: 89 U/L — HIGH (ref 10–45)
ANION GAP SERPL CALC-SCNC: 11 MMOL/L — SIGNIFICANT CHANGE UP (ref 5–17)
APTT BLD: 24.7 SEC — SIGNIFICANT CHANGE UP (ref 24.5–35.6)
AST SERPL-CCNC: 121 U/L — HIGH (ref 10–40)
BILIRUB SERPL-MCNC: 1.1 MG/DL — SIGNIFICANT CHANGE UP (ref 0.2–1.2)
BUN SERPL-MCNC: 32 MG/DL — HIGH (ref 7–23)
CALCIUM SERPL-MCNC: 9.7 MG/DL — SIGNIFICANT CHANGE UP (ref 8.4–10.5)
CHLORIDE SERPL-SCNC: 103 MMOL/L — SIGNIFICANT CHANGE UP (ref 96–108)
CK MB CFR SERPL CALC: 15 NG/ML — HIGH (ref 0–6.7)
CK MB CFR SERPL CALC: 20.4 NG/ML — HIGH (ref 0–6.7)
CK MB CFR SERPL CALC: 41.3 NG/ML — HIGH (ref 0–6.7)
CK SERPL-CCNC: 423 U/L — HIGH (ref 30–200)
CK SERPL-CCNC: 534 U/L — HIGH (ref 30–200)
CK SERPL-CCNC: 942 U/L — HIGH (ref 30–200)
CO2 SERPL-SCNC: 23 MMOL/L — SIGNIFICANT CHANGE UP (ref 22–31)
CREAT SERPL-MCNC: 1.14 MG/DL — SIGNIFICANT CHANGE UP (ref 0.5–1.3)
EGFR: 68 ML/MIN/1.73M2 — SIGNIFICANT CHANGE UP
GLUCOSE SERPL-MCNC: 121 MG/DL — HIGH (ref 70–99)
HCT VFR BLD CALC: 45.5 % — SIGNIFICANT CHANGE UP (ref 39–50)
HGB BLD-MCNC: 15.2 G/DL — SIGNIFICANT CHANGE UP (ref 13–17)
MAGNESIUM SERPL-MCNC: 2.1 MG/DL — SIGNIFICANT CHANGE UP (ref 1.6–2.6)
MCHC RBC-ENTMCNC: 30 PG — SIGNIFICANT CHANGE UP (ref 27–34)
MCHC RBC-ENTMCNC: 33.4 GM/DL — SIGNIFICANT CHANGE UP (ref 32–36)
MCV RBC AUTO: 89.7 FL — SIGNIFICANT CHANGE UP (ref 80–100)
NRBC # BLD: 0 /100 WBCS — SIGNIFICANT CHANGE UP (ref 0–0)
PLATELET # BLD AUTO: 197 K/UL — SIGNIFICANT CHANGE UP (ref 150–400)
POTASSIUM SERPL-MCNC: 4 MMOL/L — SIGNIFICANT CHANGE UP (ref 3.5–5.3)
POTASSIUM SERPL-SCNC: 4 MMOL/L — SIGNIFICANT CHANGE UP (ref 3.5–5.3)
PROT SERPL-MCNC: 7.6 G/DL — SIGNIFICANT CHANGE UP (ref 6–8.3)
RBC # BLD: 5.07 M/UL — SIGNIFICANT CHANGE UP (ref 4.2–5.8)
RBC # FLD: 13 % — SIGNIFICANT CHANGE UP (ref 10.3–14.5)
SODIUM SERPL-SCNC: 137 MMOL/L — SIGNIFICANT CHANGE UP (ref 135–145)
TROPONIN T, HIGH SENSITIVITY RESULT: 5518 NG/L — CRITICAL HIGH (ref 0–51)
TROPONIN T, HIGH SENSITIVITY RESULT: 7466 NG/L — CRITICAL HIGH (ref 0–51)
TROPONIN T, HIGH SENSITIVITY RESULT: 7569 NG/L — CRITICAL HIGH (ref 0–51)
WBC # BLD: 9.17 K/UL — SIGNIFICANT CHANGE UP (ref 3.8–10.5)
WBC # FLD AUTO: 9.17 K/UL — SIGNIFICANT CHANGE UP (ref 3.8–10.5)

## 2024-09-15 PROCEDURE — 99233 SBSQ HOSP IP/OBS HIGH 50: CPT

## 2024-09-15 RX ORDER — HEPARIN SODIUM,BOVINE 1000/ML
5000 VIAL (ML) INJECTION EVERY 8 HOURS
Refills: 0 | Status: DISCONTINUED | OUTPATIENT
Start: 2024-09-15 | End: 2024-09-17

## 2024-09-15 RX ADMIN — SACUBITRIL AND VALSARTAN 1 TABLET(S): 49; 51 TABLET, FILM COATED ORAL at 05:38

## 2024-09-15 RX ADMIN — SACUBITRIL AND VALSARTAN 1 TABLET(S): 49; 51 TABLET, FILM COATED ORAL at 18:04

## 2024-09-15 RX ADMIN — SPIRONOLACTONE 25 MILLIGRAM(S): 25 TABLET, FILM COATED ORAL at 05:38

## 2024-09-15 RX ADMIN — TICAGRELOR 90 MILLIGRAM(S): 90 TABLET ORAL at 21:57

## 2024-09-15 RX ADMIN — METOPROLOL TARTRATE 50 MILLIGRAM(S): 100 TABLET ORAL at 05:38

## 2024-09-15 RX ADMIN — Medication 5000 UNIT(S): at 21:58

## 2024-09-15 RX ADMIN — Medication 5000 UNIT(S): at 12:57

## 2024-09-15 RX ADMIN — Medication 5 MILLIGRAM(S): at 21:57

## 2024-09-15 RX ADMIN — TICAGRELOR 90 MILLIGRAM(S): 90 TABLET ORAL at 11:14

## 2024-09-15 RX ADMIN — Medication 40 MILLIGRAM(S): at 05:38

## 2024-09-15 RX ADMIN — Medication 81 MILLIGRAM(S): at 12:56

## 2024-09-15 NOTE — PROGRESS NOTE ADULT - PROBLEM SELECTOR PLAN 6
BUN/Cr 28/1.38 upon arrival to [facility].  --Cr 1.14 this am.      F: None  E: Replete if K<4 or Mag<2  N: DASH Diet  GIppx: none   VTEppx: Heparin SQ   Code: Full   Dispo: pending clinical course

## 2024-09-15 NOTE — PROGRESS NOTE ADULT - SUBJECTIVE AND OBJECTIVE BOX
Interventional Cardiology PA Adult Progress Note    Subjective Assessment:    ROS negative except as noted above.  	  MEDICATIONS:  furosemide    Tablet 40 milliGRAM(s) Oral daily  metoprolol succinate ER 50 milliGRAM(s) Oral daily  sacubitril 24 mG/valsartan 26 mG 1 Tablet(s) Oral two times a day  spironolactone 25 milliGRAM(s) Oral daily        melatonin 5 milliGRAM(s) Oral at bedtime        aspirin enteric coated 81 milliGRAM(s) Oral daily  ticagrelor 90 milliGRAM(s) Oral every 12 hours      	    [PHYSICAL EXAM:  TELEMETRY:  T(C): 36.4 (09-15-24 @ 05:12), Max: 37.2 (09-14-24 @ 20:32)  HR: 72 (09-15-24 @ 05:12) (67 - 80)  BP: 118/80 (09-15-24 @ 05:12) (98/59 - 118/80)  RR: 19 (09-15-24 @ 05:12) (17 - 19)  SpO2: 95% (09-15-24 @ 05:12) (95% - 97%)  Wt(kg): --  I&O's Summary    14 Sep 2024 07:01  -  15 Sep 2024 07:00  --------------------------------------------------------  IN: 874 mL / OUT: 1375 mL / NET: -501 mL                                              Appearance: Normal	  HEENT:   Normal oral mucosa, PERRLA, EOMI	  Neck: Supple, + JVD/ - JVD; Carotid Bruit   Cardiovascular: Normal S1 S2, No JVD, No murmurs,   Respiratory: Lungs clear to auscultation/Decreased Breath Sounds/No Rales, Rhonchi, Wheezing	  Gastrointestinal:  Soft, Non-tender, + BS	  Skin: No rashes, No ecchymoses, No cyanosis  Extremities: Normal range of motion, No clubbing, cyanosis or edema  Vascular: Peripheral pulses palpable 2+ bilaterally  Neurologic: Non-focal  Psychiatry: A & O x 3, Mood & affect appropriate      	    ECG:  	  RADIOLOGY:   DIAGNOSTIC TESTING:  [ ] Echocardiogram:   [ ]  Catheterization:  [ ] Stress Test:    [ ] MIGUEL  OTHER: 	    LABS:	 	  CARDIAC MARKERS:                                  15.2   9.17  )-----------( 197      ( 15 Sep 2024 05:30 )             45.5     09-15    137  |  103  |  32<H>  ----------------------------<  121<H>  4.0   |  23  |  1.14    Ca    9.7      15 Sep 2024 05:30  Mg     2.1     09-15    TPro  7.6  /  Alb  4.4  /  TBili  1.1  /  DBili  x   /  AST  121<H>  /  ALT  89<H>  /  AlkPhos  66  09-15    proBNP:   Lipid Profile:   HgA1c:   TSH:   PT/INR - ( 13 Sep 2024 21:32 )   PT: 11.1 sec;   INR: 0.97          PTT - ( 15 Sep 2024 05:30 )  PTT:24.7 sec Cardiology PA Adult Progress Note    Subjective Assessment: patient seen and examined at bedside this am. No acute events overnight. Patient without complaints today denying chest pain, SOB, MENJIVAR, palpitations, dizziness, LOC, N/V/D, fever/chills/sick contact, diaphoresis, orthopnea/PND, and leg swelling.    ROS negative except as noted above.  	  MEDICATIONS:  furosemide    Tablet 40 milliGRAM(s) Oral daily  metoprolol succinate ER 50 milliGRAM(s) Oral daily  sacubitril 24 mG/valsartan 26 mG 1 Tablet(s) Oral two times a day  spironolactone 25 milliGRAM(s) Oral daily  melatonin 5 milliGRAM(s) Oral at bedtime  aspirin enteric coated 81 milliGRAM(s) Oral daily  ticagrelor 90 milliGRAM(s) Oral every 12 hours      	    [PHYSICAL EXAM:  TELEMETRY:  T(C): 36.4 (09-15-24 @ 05:12), Max: 37.2 (09-14-24 @ 20:32)  HR: 72 (09-15-24 @ 05:12) (67 - 80)  BP: 118/80 (09-15-24 @ 05:12) (98/59 - 118/80)  RR: 19 (09-15-24 @ 05:12) (17 - 19)  SpO2: 95% (09-15-24 @ 05:12) (95% - 97%)  Wt(kg): --  I&O's Summary    14 Sep 2024 07:01  -  15 Sep 2024 07:00  --------------------------------------------------------  IN: 874 mL / OUT: 1375 mL / NET: -501 mL                                              Appearance: Normal	  HEENT:   Normal oral mucosa, PERRLA, EOMI	  Neck: Supple, - JVD;   Cardiovascular: Normal S1 S2, No JVD, No murmurs,   Respiratory: Lungs clear to auscultation  Gastrointestinal:  Soft, Non-tender, + BS	  Skin: No rashes, No ecchymoses, No cyanosis  Extremities: Normal range of motion, No clubbing, cyanosis or edema  Vascular: Peripheral pulses palpable 2+ bilaterally  Neurologic: Non-focal  Psychiatry: A & O x 3, Mood & affect appropriate      	    ECG:  	  RADIOLOGY:   DIAGNOSTIC TESTING:  [ ] Echocardiogram:   [ ]  Catheterization:  [ ] Stress Test:    [ ] MIGUEL  OTHER: 	    LABS:	 	  CARDIAC MARKERS:                         15.2   9.17  )-----------( 197      ( 15 Sep 2024 05:30 )             45.5     09-15    137  |  103  |  32<H>  ----------------------------<  121<H>  4.0   |  23  |  1.14    Ca    9.7      15 Sep 2024 05:30  Mg     2.1     09-15    TPro  7.6  /  Alb  4.4  /  TBili  1.1  /  DBili  x   /  AST  121<H>  /  ALT  89<H>  /  AlkPhos  66  09-15    proBNP:   Lipid Profile:   HgA1c:   TSH:   PT/INR - ( 13 Sep 2024 21:32 )   PT: 11.1 sec;   INR: 0.97          PTT - ( 15 Sep 2024 05:30 )  PTT:24.7 sec Cardiology PA Adult Progress Note    Subjective Assessment: patient seen and examined at bedside this am. No acute events overnight. Patient without complaints today denying chest pain, SOB, MENJIVAR, palpitations, dizziness, LOC, N/V/D, fever/chills/sick contact, diaphoresis, orthopnea/PND, and leg swelling.    ROS negative except as noted above.  	  MEDICATIONS:  furosemide    Tablet 40 milliGRAM(s) Oral daily  metoprolol succinate ER 50 milliGRAM(s) Oral daily  sacubitril 24 mG/valsartan 26 mG 1 Tablet(s) Oral two times a day  spironolactone 25 milliGRAM(s) Oral daily  melatonin 5 milliGRAM(s) Oral at bedtime  aspirin enteric coated 81 milliGRAM(s) Oral daily  ticagrelor 90 milliGRAM(s) Oral every 12 hours      	    [PHYSICAL EXAM:  TELEMETRY:  T(C): 36.4 (09-15-24 @ 05:12), Max: 37.2 (09-14-24 @ 20:32)  HR: 72 (09-15-24 @ 05:12) (67 - 80)  BP: 118/80 (09-15-24 @ 05:12) (98/59 - 118/80)  RR: 19 (09-15-24 @ 05:12) (17 - 19)  SpO2: 95% (09-15-24 @ 05:12) (95% - 97%)  Wt(kg): --  I&O's Summary    14 Sep 2024 07:01  -  15 Sep 2024 07:00  --------------------------------------------------------  IN: 874 mL / OUT: 1375 mL / NET: -501 mL                                              Appearance: Normal	  HEENT:   Normal oral mucosa, PERRLA, EOMI	  Neck: Supple, -JVD;   Cardiovascular: Normal S1 S2, No JVD, No murmurs,   Respiratory: Lungs clear to auscultation  Gastrointestinal:  Soft, Non-tender, + BS	  Skin: No rashes, No ecchymoses, No cyanosis  Extremities: Normal range of motion, No clubbing, cyanosis or edema  Vascular: Peripheral pulses palpable 2+ bilaterally  Neurologic: Non-focal  Psychiatry: A & O x 3, Mood & affect appropriate      	    ECG:  	  RADIOLOGY:   DIAGNOSTIC TESTING:  [ ] Echocardiogram:   [ ]  Catheterization:  [ ] Stress Test:    [ ] MIGUEL  OTHER: 	    LABS:	 	  CARDIAC MARKERS:                         15.2   9.17  )-----------( 197      ( 15 Sep 2024 05:30 )             45.5     09-15    137  |  103  |  32<H>  ----------------------------<  121<H>  4.0   |  23  |  1.14    Ca    9.7      15 Sep 2024 05:30  Mg     2.1     09-15    TPro  7.6  /  Alb  4.4  /  TBili  1.1  /  DBili  x   /  AST  121<H>  /  ALT  89<H>  /  AlkPhos  66  09-15    proBNP:   Lipid Profile:   HgA1c:   TSH:   PT/INR - ( 13 Sep 2024 21:32 )   PT: 11.1 sec;   INR: 0.97          PTT - ( 15 Sep 2024 05:30 )  PTT:24.7 sec

## 2024-09-15 NOTE — PROGRESS NOTE ADULT - PROBLEM SELECTOR PLAN 1
currently chest pain free, hemodynamically stable.  --ECG NSR @ 75 bpm w/ PVCs and RBBB. Trop at [facility] 2183 ->3246.  --S/p LHC at [facility] (9/13/24): Diffuse 3vCAD (100%mLAD, 100% D1, 100% D2, 90% OM2, 100% mRCA receives retrograde collaterals), LVEDP 35 mmHg.   --Trop T continuing to rise-- this AM lauren to >7K-- continue to trend until peak   --Per attending to attending d/w CTS Dr. Almonte, patient deemed not a surgical candidate as he has no appropriate targets.   --Will obtain CMRI with viability, then plan for PCI vs medical management.   --Continue ASA/Brilinta/BB, Statin on HOLD 2/2 transaminitis  --Completed heparin  Heparin gtt on 9/14 evening currently chest pain free, hemodynamically stable.  --ECG NSR @ 75 bpm w/ PVCs and RBBB. Trop at [facility] 2183 ->3246.  --S/p LHC at [facility] (9/13/24): Diffuse 3vCAD (100%mLAD, 100% D1, 100% D2, 90% OM2, 100% mRCA receives retrograde collaterals), LVEDP 35 mmHg.   --Trop T peaked at 7466 on 9/15/24-- no need to continue trending   --Per attending to attending d/w CTS Dr. Almonte, patient deemed not a surgical candidate as he has no appropriate targets.   --Will obtain CMRI with viability, then plan for PCI vs medical management.   --Continue ASA/Brilinta/BB, Statin on HOLD 2/2 transaminitis  --Completed heparin  Heparin gtt on 9/14 evening

## 2024-09-15 NOTE — PROGRESS NOTE ADULT - ASSESSMENT
73 yr old M with FHx of heart disease, PMHx of HTN initially admitted to [facility] 9/12/24 for SOB and found to be AHRF 2/2 Acute CHF and HTN emergency w/ course c/b by troponemia and r/i NSTEMI resulting in TX to [facility] 9/12/24 for cardiac cath that revealed severe 3vCAD. Pt now transferred to [facility] for complex PCI vs medical management (deemed not a CABG candidate).    73 yr old M with FHx of heart disease, PMHx of HTN initially admitted to [facility] 9/12/24 for SOB and found to be AHRF 2/2 Acute CHF and HTN emergency w/ course c/b by troponemia and r/i NSTEMI resulting in TX to [facility] 9/12/24 for cardiac cath that revealed severe 3vCAD. Pt now transferred to [facility] for complex PCI vs medical management (deemed not a CABG candidate) pending cMRI.

## 2024-09-15 NOTE — PROGRESS NOTE ADULT - PROBLEM SELECTOR PLAN 2
warm, -JVD, euvolemic on exam.   --TTE@[facility] (9/12/24): LVEF 17%, LV mod dilated, apical akinesis rest is severely hypokinetic, no LV thrombus, G3DD, mildly reduced RVSF, LA mildly dilated, mild AR, mod-severe MR  --Lactate 1.7 upon arrival, LFTs downtrending ->282->205, ->137 ->115.  --DIURESIS: continue Lasix 40mg PO daily.  --GDMT: c/w Toprol XL 50mg PO daily, Spironolactone 25mg PO daily and Entresto 24/26mg PO q 12hrs.  Holding Farxiga in setting of transaminitis.  --CORE MEASURES: strict I/Os and daily weights.  --HF team consulted (case known to Dr. Quiñones per transfer sign out).

## 2024-09-15 NOTE — PROGRESS NOTE ADULT - PROBLEM SELECTOR PLAN 5
likely in setting of cardiogenic shock@[facility], now downtrending.  -- ->282->205-->121, ->137 ->115-->89   -- continue to avoid hepatotoxic agents.

## 2024-09-16 LAB
ALBUMIN SERPL ELPH-MCNC: 4.2 G/DL — SIGNIFICANT CHANGE UP (ref 3.3–5)
ALP SERPL-CCNC: 66 U/L — SIGNIFICANT CHANGE UP (ref 40–120)
ALT FLD-CCNC: 64 U/L — HIGH (ref 10–45)
ANION GAP SERPL CALC-SCNC: 11 MMOL/L — SIGNIFICANT CHANGE UP (ref 5–17)
AST SERPL-CCNC: 56 U/L — HIGH (ref 10–40)
BASOPHILS # BLD AUTO: 0.06 K/UL — SIGNIFICANT CHANGE UP (ref 0–0.2)
BASOPHILS NFR BLD AUTO: 0.8 % — SIGNIFICANT CHANGE UP (ref 0–2)
BILIRUB SERPL-MCNC: 1 MG/DL — SIGNIFICANT CHANGE UP (ref 0.2–1.2)
BUN SERPL-MCNC: 33 MG/DL — HIGH (ref 7–23)
CALCIUM SERPL-MCNC: 9.9 MG/DL — SIGNIFICANT CHANGE UP (ref 8.4–10.5)
CHLORIDE SERPL-SCNC: 103 MMOL/L — SIGNIFICANT CHANGE UP (ref 96–108)
CO2 SERPL-SCNC: 25 MMOL/L — SIGNIFICANT CHANGE UP (ref 22–31)
CREAT SERPL-MCNC: 1.23 MG/DL — SIGNIFICANT CHANGE UP (ref 0.5–1.3)
EGFR: 62 ML/MIN/1.73M2 — SIGNIFICANT CHANGE UP
EOSINOPHIL # BLD AUTO: 0.25 K/UL — SIGNIFICANT CHANGE UP (ref 0–0.5)
EOSINOPHIL NFR BLD AUTO: 3.2 % — SIGNIFICANT CHANGE UP (ref 0–6)
GLUCOSE SERPL-MCNC: 109 MG/DL — HIGH (ref 70–99)
HCT VFR BLD CALC: 48.4 % — SIGNIFICANT CHANGE UP (ref 39–50)
HGB BLD-MCNC: 15.9 G/DL — SIGNIFICANT CHANGE UP (ref 13–17)
IMM GRANULOCYTES NFR BLD AUTO: 0.5 % — SIGNIFICANT CHANGE UP (ref 0–0.9)
LYMPHOCYTES # BLD AUTO: 1.78 K/UL — SIGNIFICANT CHANGE UP (ref 1–3.3)
LYMPHOCYTES # BLD AUTO: 22.7 % — SIGNIFICANT CHANGE UP (ref 13–44)
MAGNESIUM SERPL-MCNC: 2.2 MG/DL — SIGNIFICANT CHANGE UP (ref 1.6–2.6)
MCHC RBC-ENTMCNC: 29.8 PG — SIGNIFICANT CHANGE UP (ref 27–34)
MCHC RBC-ENTMCNC: 32.9 GM/DL — SIGNIFICANT CHANGE UP (ref 32–36)
MCV RBC AUTO: 90.8 FL — SIGNIFICANT CHANGE UP (ref 80–100)
MONOCYTES # BLD AUTO: 0.88 K/UL — SIGNIFICANT CHANGE UP (ref 0–0.9)
MONOCYTES NFR BLD AUTO: 11.2 % — SIGNIFICANT CHANGE UP (ref 2–14)
NEUTROPHILS # BLD AUTO: 4.84 K/UL — SIGNIFICANT CHANGE UP (ref 1.8–7.4)
NEUTROPHILS NFR BLD AUTO: 61.6 % — SIGNIFICANT CHANGE UP (ref 43–77)
NRBC # BLD: 0 /100 WBCS — SIGNIFICANT CHANGE UP (ref 0–0)
PLATELET # BLD AUTO: 207 K/UL — SIGNIFICANT CHANGE UP (ref 150–400)
POTASSIUM SERPL-MCNC: 4.1 MMOL/L — SIGNIFICANT CHANGE UP (ref 3.5–5.3)
POTASSIUM SERPL-SCNC: 4.1 MMOL/L — SIGNIFICANT CHANGE UP (ref 3.5–5.3)
PROT SERPL-MCNC: 7.8 G/DL — SIGNIFICANT CHANGE UP (ref 6–8.3)
RBC # BLD: 5.33 M/UL — SIGNIFICANT CHANGE UP (ref 4.2–5.8)
RBC # FLD: 12.7 % — SIGNIFICANT CHANGE UP (ref 10.3–14.5)
SODIUM SERPL-SCNC: 139 MMOL/L — SIGNIFICANT CHANGE UP (ref 135–145)
WBC # BLD: 7.85 K/UL — SIGNIFICANT CHANGE UP (ref 3.8–10.5)
WBC # FLD AUTO: 7.85 K/UL — SIGNIFICANT CHANGE UP (ref 3.8–10.5)

## 2024-09-16 PROCEDURE — 99233 SBSQ HOSP IP/OBS HIGH 50: CPT | Mod: GC

## 2024-09-16 PROCEDURE — 99233 SBSQ HOSP IP/OBS HIGH 50: CPT

## 2024-09-16 RX ORDER — ACETAMINOPHEN 325 MG/1
650 TABLET ORAL EVERY 6 HOURS
Refills: 0 | Status: DISCONTINUED | OUTPATIENT
Start: 2024-09-16 | End: 2024-09-17

## 2024-09-16 RX ORDER — DAPAGLIFLOZIN 10 MG/1
1 TABLET, FILM COATED ORAL
Qty: 30 | Refills: 0
Start: 2024-09-16 | End: 2024-10-15

## 2024-09-16 RX ORDER — METOPROLOL TARTRATE 100 MG/1
75 TABLET ORAL DAILY
Refills: 0 | Status: DISCONTINUED | OUTPATIENT
Start: 2024-09-17 | End: 2024-09-17

## 2024-09-16 RX ORDER — ACETAMINOPHEN 325 MG/1
650 TABLET ORAL ONCE
Refills: 0 | Status: COMPLETED | OUTPATIENT
Start: 2024-09-16 | End: 2024-09-16

## 2024-09-16 RX ORDER — SACUBITRIL AND VALSARTAN 49; 51 MG/1; MG/1
1 TABLET, FILM COATED ORAL
Refills: 0 | Status: DISCONTINUED | OUTPATIENT
Start: 2024-09-17 | End: 2024-09-17

## 2024-09-16 RX ORDER — METOPROLOL TARTRATE 100 MG/1
25 TABLET ORAL ONCE
Refills: 0 | Status: COMPLETED | OUTPATIENT
Start: 2024-09-16 | End: 2024-09-16

## 2024-09-16 RX ADMIN — ACETAMINOPHEN 650 MILLIGRAM(S): 325 TABLET ORAL at 23:02

## 2024-09-16 RX ADMIN — SPIRONOLACTONE 25 MILLIGRAM(S): 25 TABLET, FILM COATED ORAL at 05:17

## 2024-09-16 RX ADMIN — Medication 5000 UNIT(S): at 16:05

## 2024-09-16 RX ADMIN — Medication 81 MILLIGRAM(S): at 12:04

## 2024-09-16 RX ADMIN — Medication 5000 UNIT(S): at 05:17

## 2024-09-16 RX ADMIN — SACUBITRIL AND VALSARTAN 1 TABLET(S): 49; 51 TABLET, FILM COATED ORAL at 05:17

## 2024-09-16 RX ADMIN — Medication 40 MILLIGRAM(S): at 09:18

## 2024-09-16 RX ADMIN — METOPROLOL TARTRATE 25 MILLIGRAM(S): 100 TABLET ORAL at 12:04

## 2024-09-16 RX ADMIN — TICAGRELOR 90 MILLIGRAM(S): 90 TABLET ORAL at 23:02

## 2024-09-16 RX ADMIN — METOPROLOL TARTRATE 50 MILLIGRAM(S): 100 TABLET ORAL at 05:17

## 2024-09-16 RX ADMIN — SACUBITRIL AND VALSARTAN 1 TABLET(S): 49; 51 TABLET, FILM COATED ORAL at 16:05

## 2024-09-16 RX ADMIN — TICAGRELOR 90 MILLIGRAM(S): 90 TABLET ORAL at 09:18

## 2024-09-16 RX ADMIN — Medication 5 MILLIGRAM(S): at 23:02

## 2024-09-16 RX ADMIN — Medication 5000 UNIT(S): at 23:02

## 2024-09-16 NOTE — PROGRESS NOTE ADULT - PROBLEM SELECTOR PLAN 5
likely in setting of cardiogenic shock@[facility], now downtrending.  -- ->282->205-->121, ->137 ->115-->89   -- continue to avoid hepatotoxic agents. likely in setting of cardiogenic shock@[facility], now downtrending.  -- ->282->205->121->56, ->137 ->115->89->64  -- continue to avoid hepatotoxic agents.  --? restart Farxiga and Statin in AM

## 2024-09-16 NOTE — PROGRESS NOTE ADULT - PROBLEM SELECTOR PROBLEM 2
NSTEMI (non-ST elevation myocardial infarction)
HFrEF (heart failure with reduced ejection fraction)

## 2024-09-16 NOTE — PROGRESS NOTE ADULT - ASSESSMENT
73 yr old M with FHx of heart disease, PMHx of HTN initially admitted to [facility] 9/12/24 for SOB and found to be AHRF 2/2 Acute CHF and HTN emergency w/ course c/b by troponemia and r/i NSTEMI resulting in TX to [facility] 9/12/24 for cardiac cath that revealed severe 3vCAD. Pt now transferred to [facility] for complex PCI vs medical management (deemed not a CABG candidate) pending cMRI.    73 yr old M with FHx of heart disease, PMHx of HTN initially admitted to [facility] 9/12/24 for SOB and found to be AHRF 2/2 Acute CHF and HTN emergency w/ course c/b by troponemia and r/i NSTEMI resulting in TX to [facility] 9/12/24 for cardiac cath that revealed severe 3vCAD. Pt now transferred to [facility] for complex PCI vs medical management (deemed not a CABG candidate) pending cMRI tonight to assess viability.

## 2024-09-16 NOTE — PROGRESS NOTE ADULT - PROBLEM SELECTOR PLAN 2
Severe 3vCAD  -patient presenting to [facility] with NSTEMI with LHC showing diffuse 3vCAD (100%mLAD, 100% D1, 100% D2, 90% OM2, 100% mRCA receives retrograde collaterals)  -case discussed with CT surgery, patient not CABG candidate   -pending possible complex PCI however likely no good targets   -c/w aspirin , Brilinta (s/p heparin) for management of NSTEMI   -high dose statin   -HST peaked  -If CMR not possible to obtain today, can be done as outpatient

## 2024-09-16 NOTE — PROGRESS NOTE ADULT - SUBJECTIVE AND OBJECTIVE BOX
HPI: 73yoM PMHx HTN and FamHx MI/HF (mother) and PAD(father) admitted to [facility] 24 for SOB and found to be AHRF 2/2 Acute CHF and HTN emergency w/ course c/b by troponemia and r/i NSTEMI resulting in tx to [facility] 24 for cardiac cath that revealed severe 3vCAD w/ subsequent tx to [facility] for CABG vs complex PCI vs medical management.   @ [place] on 24 he presented with sudden onset of progressively worsening severe left sided "gurgling" in the chest associated with dyspnea. No similar prior episodes. BIB EMS. On CXR bilateral vascular congestion. He met criteria on arrival there for htn emergency. He was placed on bipap for associated AHRF. He was managed medically with initiation of several cardiac medications including iv lasix, nitro drip, brillinta load, aspirin, high intensity statin, beta blocker, sglt2, hep drip. Troponin steadily began rising and reached peak in the 700s. Then was transferred him to [facility] for further cardiac intervention. @ [facility] 24 s/p cardiac cath that revealed severe 3vCAD, no interventions performed and patient transferred to [facility] for CABG vs complex PCI vs medical management. Per attending to attending d/w CTS Dr. Almonte, patient deemed not a surgical candidate as he has no appropriate targets. TTE done showing severely reduced LVSF with EF 15-20% for which heart failure consulted.    SUBJECTIVE / INTERVAL HPI: Patient seen and examined at bedside. Reports he is feeling better and denies any current SOB or chest pain. Denies any known history of CHF, CAD or MI prior to current admission. Does not follow with a cardiologist as outpatient. Reports morning of admission he felt a "gurgling" sensation in his chest with SOB however has never experienced this before and denies any SOB, MENJIVAR or chest pain prior to day of admission. No LE edema either.       INTERVAL EVENTS:  -MR ordered for viability    Cardiac medications administered in the last 48h:  spironolactone: 25 milliGRAM(s) Oral (24 @ 05:17)  sacubitril 24 mG/valsartan 26 m Tablet(s) Oral (24 @ 05:17)  metoprolol succinate ER: 50 milliGRAM(s) Oral (24 @ 05:17)  sacubitril 24 mG/valsartan 26 m Tablet(s) Oral (09-15-24 @ 18:04)  spironolactone: 25 milliGRAM(s) Oral (09-15-24 @ 05:38)  sacubitril 24 mG/valsartan 26 m Tablet(s) Oral (09-15-24 @ 05:38)  metoprolol succinate ER: 50 milliGRAM(s) Oral (09-15-24 @ 05:38)  furosemide    Tablet: 40 milliGRAM(s) Oral (09-15-24 @ 05:38)  sacubitril 24 mG/valsartan 26 m Tablet(s) Oral (24 @ 17:43)  spironolactone: 25 milliGRAM(s) Oral (24 @ 11:53)  furosemide    Tablet: 40 milliGRAM(s) Oral (24 @ 11:52)      MEDICATIONS  (STANDING):  aspirin enteric coated 81 milliGRAM(s) Oral daily  furosemide    Tablet 40 milliGRAM(s) Oral daily  heparin   Injectable 5000 Unit(s) SubCutaneous every 8 hours  melatonin 5 milliGRAM(s) Oral at bedtime  metoprolol succinate ER 50 milliGRAM(s) Oral daily  sacubitril 24 mG/valsartan 26 mG 1 Tablet(s) Oral two times a day  spironolactone 25 milliGRAM(s) Oral daily  ticagrelor 90 milliGRAM(s) Oral every 12 hours    Home Medications:  Aspirin EC 81 mg oral delayed release tablet: 1 tab(s) orally once a day (13 Sep 2024 21:53)  atorvastatin 80 mg oral tablet: 1 tab(s) orally once a day (13 Sep 2024 21:54)  Farxiga 10 mg oral tablet: 1 tab(s) orally once a day (13 Sep 2024 21:53)  Lopressor 50 mg oral tablet: 1 tab(s) orally 2 times a day (13 Sep 2024 21:53)        VITALS 24H  T(F): 98.4 (24 @ 05:15), Max: 99 (09-15-24 @ 17:38)  HR: 75 (24 @ 05:15) (75 - 77)  BP: 115/76 (24 @ 05:15) (107/63 - 122/90)  BP(mean): 88 (24 @ 05:15) (88 - 101)  ABP: --  ABP(mean): --  RR: 17 (24 @ 05:15) (16 - 17)  SpO2: 98% (24 @ 05:15) (95% - 98%)  CVP(mm Hg): --    I/O Detail 24H    15 Sep 2024 07:01  -  16 Sep 2024 07:00  --------------------------------------------------------  IN:    Oral Fluid: 570 mL  Total IN: 570 mL    OUT:    Voided (mL): 325 mL  Total OUT: 325 mL    Total NET: 245 mL        Daily Weight Trend (kg):   88.9 (24 @ 05:33)  87.9 (09-15-24 @ 05:45)  92.6 (24 @ 06:41)      PHYSICAL EXAM:  GEN: NAD  HEENT: EOMI   RESP: CTA b/l  CV: RRR. Normal S1/S2. No m/r/g.  ABD: soft, non-distended  EXT: No edema   NEURO: alert and attentive    LABS:  CBC 24 @ 05:30                        15.9   7.85  )-----------( 207                   48.4     Hgb trend: 15.9 <-- , 15.2 <-- , 15.5 <-- , 15.3 <-- , 15.4 <--   WBC trend: 7.85 <-- , 9.17 <-- , 11.20 <-- , 9.99 <-- , 10.63 <--       CMP 24 @ 05:30    139  |  103  |  33<H>  ----------------------------<  109<H>  4.1   |  25  |  1.23    Ca    9.9      24 @ 05:30  Mg     2.2         TPro  7.8  /  Alb  4.2  /  TBili  1.0  /  DBili  x   /  AST  56<H>  /  ALT  64<H>  /  AlkPhos  66         Serum Cr (eGFR) trend: 1.23 (62) <-- , 1.14 (68) <-- , 1.14 (68) <-- , 1.36 (55) <--       PTT - ( 15 Sep 2024 05:30 ):24.7 sec    Cardiac Markers   09-15-24 @ 12:17: HS Trop T 7466<HH> / CK x     / CKMB 15.0<H>  09-15-24 @ 05:30: HS Trop T 7569<HH> / CK x     / CKMB 20.4<H>  24 @ 19:43: HS Trop T 5999<HH> / CK x     / CKMB 33.3<H>    Hgb trend:  15.9 ( @ 05:30)  15.2 (09-15 @ 05:30)  15.5 ( @ 13:40)  15.3 ( @ 07:45)  15.4 ( @ 21:31)    Trend platelets  207 K/uL (24 @ 05:30)  197 K/uL (09-15-24 @ 05:30)  204 K/uL (24 @ 13:40)    WBC trend:   7.85 ( 05:30)  9.17 (09-15 @ 05:30)  11.20 *H* ( @ 13:40)  9.99 ( @ 07:45)  10.63 *H* ( 21:31)    Na trend:  139 ( @ 05:30)  137 (09-15 @ 05:30)  138 ( 07:45)  139 ( 21:31)    Serum K trend:   4.1 (24 @ 05:30)  4.0 (09-15-24 @ 05:30)    Trend Cr (GFR)  1.23 mg/dL (24 @ 05:30)  1.14 mg/dL (09-15-24 @ 05:30)    LFTs trend:   24 @ 05:30: AST 56 | ALT 64 | Alk P 66 | T Bakari 1.0  09-15-24 @ 05:30:  | ALT 89 | Alk P 66 | T Bakari 1.1  24 @ 07:45:  |  | Alk P 63 | T Bakari 1.1  24 @ 21:31:  |  | Alk P 69 | T Bakari 0.8    Lactate trend:    HPI: 73yoM PMHx HTN and FamHx MI/HF (mother) and PAD(father) admitted to [facility] 24 for SOB and found to be AHRF 2/2 Acute CHF and HTN emergency w/ course c/b by troponemia and r/i NSTEMI resulting in tx to [facility] 24 for cardiac cath that revealed severe 3vCAD w/ subsequent tx to [facility] for CABG vs complex PCI vs medical management.   @ [place] on 24 he presented with sudden onset of progressively worsening severe left sided "gurgling" in the chest associated with dyspnea. No similar prior episodes. BIB EMS. On CXR bilateral vascular congestion. He met criteria on arrival there for htn emergency. He was placed on bipap for associated AHRF. He was managed medically with initiation of several cardiac medications including iv lasix, nitro drip, brillinta load, aspirin, high intensity statin, beta blocker, sglt2, hep drip. Troponin steadily began rising and reached peak in the 700s. Then was transferred him to [facility] for further cardiac intervention. @ [facility] 24 s/p cardiac cath that revealed severe 3vCAD, no interventions performed and patient transferred to [facility] for CABG vs complex PCI vs medical management. Per attending to attending d/w CTS Dr. Almonte, patient deemed not a surgical candidate as he has no appropriate targets. TTE done showing severely reduced LVSF with EF 15-20% for which heart failure consulted.    SUBJECTIVE / INTERVAL HPI: Patient seen and examined at bedside. Reports he is feeling better and denies any current SOB or chest pain. Denies any known history of CHF, CAD or MI prior to current admission. Does not follow with a cardiologist as outpatient. Reports morning of admission he felt a "gurgling" sensation in his chest with SOB however has never experienced this before and denies any SOB, MENJIVAR or chest pain prior to day of admission. No LE edema either.       INTERVAL EVENTS:  -MR ordered for viability  -No CP/SOB    Cardiac medications administered in the last 48h:  spironolactone: 25 milliGRAM(s) Oral (24 @ 05:17)  sacubitril 24 mG/valsartan 26 m Tablet(s) Oral (24 @ 05:17)  metoprolol succinate ER: 50 milliGRAM(s) Oral (24 @ 05:17)  sacubitril 24 mG/valsartan 26 m Tablet(s) Oral (09-15-24 @ 18:04)  spironolactone: 25 milliGRAM(s) Oral (09-15-24 @ 05:38)  sacubitril 24 mG/valsartan 26 m Tablet(s) Oral (09-15-24 @ 05:38)  metoprolol succinate ER: 50 milliGRAM(s) Oral (09-15-24 @ 05:38)  furosemide    Tablet: 40 milliGRAM(s) Oral (09-15-24 @ 05:38)  sacubitril 24 mG/valsartan 26 m Tablet(s) Oral (24 @ 17:43)  spironolactone: 25 milliGRAM(s) Oral (24 @ 11:53)  furosemide    Tablet: 40 milliGRAM(s) Oral (24 @ 11:52)      MEDICATIONS  (STANDING):  aspirin enteric coated 81 milliGRAM(s) Oral daily  furosemide    Tablet 40 milliGRAM(s) Oral daily  heparin   Injectable 5000 Unit(s) SubCutaneous every 8 hours  melatonin 5 milliGRAM(s) Oral at bedtime  metoprolol succinate ER 50 milliGRAM(s) Oral daily  sacubitril 24 mG/valsartan 26 mG 1 Tablet(s) Oral two times a day  spironolactone 25 milliGRAM(s) Oral daily  ticagrelor 90 milliGRAM(s) Oral every 12 hours    Home Medications:  Aspirin EC 81 mg oral delayed release tablet: 1 tab(s) orally once a day (13 Sep 2024 21:53)  atorvastatin 80 mg oral tablet: 1 tab(s) orally once a day (13 Sep 2024 21:54)  Farxiga 10 mg oral tablet: 1 tab(s) orally once a day (13 Sep 2024 21:53)  Lopressor 50 mg oral tablet: 1 tab(s) orally 2 times a day (13 Sep 2024 21:53)        VITALS 24H  T(F): 98.4 (24 @ 05:15), Max: 99 (09-15-24 @ 17:38)  HR: 75 (24 @ 05:15) (75 - 77)  BP: 115/76 (24 @ 05:15) (107/63 - 122/90)  BP(mean): 88 (24 @ 05:15) (88 - 101)  ABP: --  ABP(mean): --  RR: 17 (24 @ 05:15) (16 - 17)  SpO2: 98% (24 @ 05:15) (95% - 98%)  CVP(mm Hg): --    I/O Detail 24H    15 Sep 2024 07:01  -  16 Sep 2024 07:00  --------------------------------------------------------  IN:    Oral Fluid: 570 mL  Total IN: 570 mL    OUT:    Voided (mL): 325 mL  Total OUT: 325 mL    Total NET: 245 mL        Daily Weight Trend (kg):   88.9 (24 @ 05:33)  87.9 (09-15-24 @ 05:45)  92.6 (24 @ 06:41)      PHYSICAL EXAM:  GEN: NAD  HEENT: EOMI   RESP: CTA b/l  CV: RRR. Normal S1/S2. No m/r/g.  ABD: soft, non-distended  EXT: No edema   NEURO: alert and attentive    LABS:  CBC 24 @ 05:30                        15.9   7.85  )-----------( 207                   48.4     Hgb trend: 15.9 <-- , 15.2 <-- , 15.5 <-- , 15.3 <-- , 15.4 <--   WBC trend: 7.85 <-- , 9.17 <-- , 11.20 <-- , 9.99 <-- , 10.63 <--       CMP 24 @ 05:30    139  |  103  |  33<H>  ----------------------------<  109<H>  4.1   |  25  |  1.23    Ca    9.9      24 @ 05:30  Mg     2.2         TPro  7.8  /  Alb  4.2  /  TBili  1.0  /  DBili  x   /  AST  56<H>  /  ALT  64<H>  /  AlkPhos  66         Serum Cr (eGFR) trend: 1.23 (62) <-- , 1.14 (68) <-- , 1.14 (68) <-- , 1.36 (55) <--       PTT - ( 15 Sep 2024 05:30 ):24.7 sec    Cardiac Markers   09-15-24 @ 12:17: HS Trop T 7466<HH> / CK x     / CKMB 15.0<H>  09-15-24 @ 05:30: HS Trop T 7569<HH> / CK x     / CKMB 20.4<H>  24 @ 19:43: HS Trop T 5999<HH> / CK x     / CKMB 33.3<H>    Hgb trend:  15.9 ( @ 05:30)  15.2 (09-15 @ 05:30)  15.5 ( @ 13:40)  15.3 ( @ 07:45)  15.4 ( @ 21:31)    Trend platelets  207 K/uL (24 @ 05:30)  197 K/uL (09-15-24 @ 05:30)  204 K/uL (24 @ 13:40)    WBC trend:   7.85 ( @ 05:30)  9.17 (09-15 @ 05:30)  11.20 *H* ( @ 13:40)  9.99 ( @ 07:45)  10.63 *H* ( 21:31)    Na trend:  139 ( @ 05:30)  137 (09-15 @ 05:30)  138 ( @ 07:45)  139 ( 21:31)    Serum K trend:   4.1 (24 @ 05:30)  4.0 (09-15-24 @ 05:30)    Trend Cr (GFR)  1.23 mg/dL (24 @ 05:30)  1.14 mg/dL (09-15-24 @ 05:30)    LFTs trend:   24 @ 05:30: AST 56 | ALT 64 | Alk P 66 | T Bakair 1.0  09-15-24 @ 05:30:  | ALT 89 | Alk P 66 | T Bakari 1.1  24 @ 07:45:  |  | Alk P 63 | T Bakari 1.1  24 @ 21:31:  |  | Alk P 69 | T Bakari 0.8    Lactate trend:

## 2024-09-16 NOTE — PROGRESS NOTE ADULT - PROBLEM SELECTOR PLAN 1
currently chest pain free, hemodynamically stable.  --ECG NSR @ 75 bpm w/ PVCs and RBBB. Trop at [facility] [phone] ->[phone].  --S/p LHC at [facility] (9/13/24): Diffuse 3vCAD (100%mLAD, 100% D1, 100% D2, 90% OM2, 100% mRCA receives retrograde collaterals), LVEDP 35 mmHg.   --Trop T peaked at 7466 on 9/15/24-- no need to continue trending   --Per attending to attending d/w CTS Dr. Almonte, patient deemed not a surgical candidate as he has no appropriate targets.   --Will obtain CMRI with viability, then plan for PCI vs medical management.   --Continue ASA/Brilinta/BB, Statin on HOLD 2/2 transaminitis  --Completed heparin  Heparin gtt on 9/14 evening currently chest pain free, hemodynamically stable.  --ECG NSR @ 75 bpm w/ PVCs and RBBB. Trop at [facility] [phone] ->[phone].  --S/p LHC at [facility] (9/13/24): Diffuse 3vCAD (100%mLAD, 100% D1, 100% D2, 90% OM2, 100% mRCA receives retrograde collaterals), LVEDP 35 mmHg.   --Trop T peaked at 7466 on 9/15/24-- no need to continue trending   --Per attending to attending d/w CTS Dr. Almonte, patient deemed not a surgical candidate as he has no appropriate targets.   --Continue ASA/Brilinta/BB, Statin on HOLD 2/2 transaminitis  --Completed Heparin gtt on 9/14 evening  --CMRI planned for 09/16/2024 then plan for PCI vs medical management currently chest pain free, hemodynamically stable.  --ECG NSR @ 75 bpm w/ PVCs and RBBB. Trop at [facility] [phone] ->[phone].  --S/p LHC at [facility] (9/13/24): Diffuse 3vCAD (100%mLAD, 100% D1, 100% D2, 90% OM2, 100% mRCA receives retrograde collaterals), LVEDP 35 mmHg.   --Trop T peaked at 7466 on 9/15/24-- no need to continue trending   --Per attending to attending d/w CTS Dr. Almonte, patient deemed not a surgical candidate as he has no appropriate distal targets.   --Continue ASA/Brilinta/BB, Statin on HOLD 2/2 transaminitis (resume In am?)   --Completed Heparin gtt on 9/14 evening  --CMRI planned for tonight 9 pm to assess viability--- then can decide  PCI vs medical management

## 2024-09-16 NOTE — PROGRESS NOTE ADULT - PROBLEM SELECTOR PLAN 6
BUN/Cr 28/1.38 upon arrival to [facility].  --Cr 1.14 this am.      F: None  E: Replete if K<4 or Mag<2  N: DASH Diet  GIppx: none   VTEppx: Heparin SQ   Code: Full   Dispo: pending clinical course BUN/Cr 28/1.38 upon arrival to [facility].  --Cr 1.23 this am.      F: None  E: Replete if K<4 or Mag<2  N: DASH Diet  GIppx: none   VTEppx: Heparin SQ   Code: Full   Dispo: pending clinical course

## 2024-09-16 NOTE — PROGRESS NOTE ADULT - PROBLEM SELECTOR PLAN 1
Etiology: ischemic CM given severe 3vCAD on C with RWMAs on TTE   GDMT:   -c/w toprol 75mg qd   -c/w spironolactone 25mg qd   -Increase to entresto 49/51mg BID  -Start covered SGLT2i before discharge  Diuresis: Stop diuretics  Valves: no significant valvular disease   AT: premature   Device: premature    Please ensure patient has outpatient follow up with heart failure clinic upon discharge - most likely at [facility]. Please email for appointment.

## 2024-09-16 NOTE — PROGRESS NOTE ADULT - SUBJECTIVE AND OBJECTIVE BOX
Interventional Cardiology PA Adult Progress Note    Subjective Assessment:    ROS negative except as noted above.  	  MEDICATIONS:  furosemide    Tablet 40 milliGRAM(s) Oral daily  metoprolol succinate ER 50 milliGRAM(s) Oral daily  sacubitril 24 mG/valsartan 26 mG 1 Tablet(s) Oral two times a day  spironolactone 25 milliGRAM(s) Oral daily        melatonin 5 milliGRAM(s) Oral at bedtime        aspirin enteric coated 81 milliGRAM(s) Oral daily  heparin   Injectable 5000 Unit(s) SubCutaneous every 8 hours  ticagrelor 90 milliGRAM(s) Oral every 12 hours      	    [PHYSICAL EXAM:  TELEMETRY:  T(C): 36.9 (09-16-24 @ 05:15), Max: 37.2 (09-15-24 @ 17:38)  HR: 75 (09-16-24 @ 05:15) (75 - 77)  BP: 115/76 (09-16-24 @ 05:15) (107/63 - 122/90)  RR: 17 (09-16-24 @ 05:15) (16 - 17)  SpO2: 98% (09-16-24 @ 05:15) (95% - 98%)  Wt(kg): --  I&O's Summary    15 Sep 2024 07:01  -  16 Sep 2024 07:00  --------------------------------------------------------  IN: 570 mL / OUT: 325 mL / NET: 245 mL                                              Appearance: Normal	  HEENT:   Normal oral mucosa, PERRLA, EOMI	  Neck: Supple, + JVD/ - JVD; Carotid Bruit   Cardiovascular: Normal S1 S2, No JVD, No murmurs,   Respiratory: Lungs clear to auscultation/Decreased Breath Sounds/No Rales, Rhonchi, Wheezing	  Gastrointestinal:  Soft, Non-tender, + BS	  Skin: No rashes, No ecchymoses, No cyanosis  Extremities: Normal range of motion, No clubbing, cyanosis or edema  Vascular: Peripheral pulses palpable 2+ bilaterally  Neurologic: Non-focal  Psychiatry: A & O x 3, Mood & affect appropriate      	    ECG:  	  RADIOLOGY:   DIAGNOSTIC TESTING:  [ ] Echocardiogram:   [ ]  Catheterization:  [ ] Stress Test:    [ ] MIGUEL  OTHER: 	    LABS:	 	  CARDIAC MARKERS:                            15.9   7.85  )-----------( 207      ( 16 Sep 2024 05:30 )             48.4     09-16    139  |  103  |  33<H>  ----------------------------<  109<H>  4.1   |  25  |  1.23    Ca    9.9      16 Sep 2024 05:30  Mg     2.2     09-16    TPro  7.8  /  Alb  4.2  /  TBili  1.0  /  DBili  x   /  AST  56<H>  /  ALT  64<H>  /  AlkPhos  66  09-16    proBNP:   Lipid Profile:   HgA1c:   TSH:   PTT - ( 15 Sep 2024 05:30 )  PTT:24.7 sec Interventional Cardiology PA Adult Progress Note    CC: chest pain     Subjective Assessment: Patient was seen and examined bedside. Denies SOB, chest pain, palpitations, fevers, chills, N/V.     ROS negative except as noted above.  	  MEDICATIONS:  furosemide    Tablet 40 milliGRAM(s) Oral daily  metoprolol succinate ER 50 milliGRAM(s) Oral daily  sacubitril 24 mG/valsartan 26 mG 1 Tablet(s) Oral two times a day  spironolactone 25 milliGRAM(s) Oral daily  melatonin 5 milliGRAM(s) Oral at bedtime  aspirin enteric coated 81 milliGRAM(s) Oral daily  heparin   Injectable 5000 Unit(s) SubCutaneous every 8 hours  ticagrelor 90 milliGRAM(s) Oral every 12 hours    [PHYSICAL EXAM:  TELEMETRY:  T(C): 36.9 (09-16-24 @ 05:15), Max: 37.2 (09-15-24 @ 17:38)  HR: 75 (09-16-24 @ 05:15) (75 - 77)  BP: 115/76 (09-16-24 @ 05:15) (107/63 - 122/90)  RR: 17 (09-16-24 @ 05:15) (16 - 17)  SpO2: 98% (09-16-24 @ 05:15) (95% - 98%)  Wt(kg): --  I&O's Summary    15 Sep 2024 07:01  -  16 Sep 2024 07:00  --------------------------------------------------------  IN: 570 mL / OUT: 325 mL / NET: 245 mL                   Appearance: Normal	  HEENT:   Normal oral mucosa, PERRLA, EOMI	  Neck: Supple, - JVD; No Carotid Bruit   Cardiovascular: Normal S1 S2, No JVD, No murmurs,   Respiratory: Lungs clear to auscultation/Decreased Breath Sounds/No Rales, Rhonchi, Wheezing	  Gastrointestinal:  Soft, Non-tender, + BS	  Skin: No rashes, No ecchymoses, No cyanosis  Extremities: Normal range of motion, No clubbing, cyanosis or edema  Vascular: Peripheral pulses palpable 2+ bilaterally  Neurologic: Non-focal  Psychiatry: A & O x 3, Mood & affect appropriate  	    ECG:  	  RADIOLOGY:   DIAGNOSTIC TESTING:  [ ] Echocardiogram:   [ ]  Catheterization:  [ ] Stress Test:    [ ] MIGUEL  OTHER: 	    LABS:	 	  CARDIAC MARKERS:                        15.9   7.85  )-----------( 207      ( 16 Sep 2024 05:30 )             48.4     09-16    139  |  103  |  33<H>  ----------------------------<  109<H>  4.1   |  25  |  1.23    Ca    9.9      16 Sep 2024 05:30  Mg     2.2     09-16    TPro  7.8  /  Alb  4.2  /  TBili  1.0  /  DBili  x   /  AST  56<H>  /  ALT  64<H>  /  AlkPhos  66  09-16    proBNP:   Lipid Profile:   HgA1c:   TSH:   PTT - ( 15 Sep 2024 05:30 )  PTT:24.7 sec Cardiology PA Adult Progress Note    CC: chest pain     Subjective Assessment: Patient was seen and examined bedside. Denies SOB, chest pain, palpitations, fevers, chills, N/V.     ROS negative except as noted above.  	  MEDICATIONS:  furosemide    Tablet 40 milliGRAM(s) Oral daily  metoprolol succinate ER 50 milliGRAM(s) Oral daily  sacubitril 24 mG/valsartan 26 mG 1 Tablet(s) Oral two times a day  spironolactone 25 milliGRAM(s) Oral daily  melatonin 5 milliGRAM(s) Oral at bedtime  aspirin enteric coated 81 milliGRAM(s) Oral daily  heparin   Injectable 5000 Unit(s) SubCutaneous every 8 hours  ticagrelor 90 milliGRAM(s) Oral every 12 hours    [PHYSICAL EXAM:  TELEMETRY:  T(C): 36.9 (09-16-24 @ 05:15), Max: 37.2 (09-15-24 @ 17:38)  HR: 75 (09-16-24 @ 05:15) (75 - 77)  BP: 115/76 (09-16-24 @ 05:15) (107/63 - 122/90)  RR: 17 (09-16-24 @ 05:15) (16 - 17)  SpO2: 98% (09-16-24 @ 05:15) (95% - 98%)  Wt(kg): --  I&O's Summary    15 Sep 2024 07:01  -  16 Sep 2024 07:00  --------------------------------------------------------  IN: 570 mL / OUT: 325 mL / NET: 245 mL                   Appearance: Normal	  HEENT:   Normal oral mucosa, PERRLA, EOMI	  Neck: Supple, - JVD; No Carotid Bruit   Cardiovascular: Normal S1 S2, No JVD, No murmurs,   Respiratory: Lungs clear to auscultation	  Gastrointestinal:  Soft, Non-tender, + BS	  Skin: No rashes, No ecchymoses, No cyanosis  Extremities: Normal range of motion, No clubbing, cyanosis or edema  Vascular: Peripheral pulses palpable 2+ bilaterally  Neurologic: Non-focal  Psychiatry: A & O x 3, Mood & affect appropriate  	    ECG:  	  RADIOLOGY:   DIAGNOSTIC TESTING:  [ ] Echocardiogram:   [ ]  Catheterization:  [ ] Stress Test:    [ ] MIGUEL  OTHER: 	    LABS:	 	  CARDIAC MARKERS:                        15.9   7.85  )-----------( 207      ( 16 Sep 2024 05:30 )             48.4     09-16    139  |  103  |  33<H>  ----------------------------<  109<H>  4.1   |  25  |  1.23    Ca    9.9      16 Sep 2024 05:30  Mg     2.2     09-16    TPro  7.8  /  Alb  4.2  /  TBili  1.0  /  DBili  x   /  AST  56<H>  /  ALT  64<H>  /  AlkPhos  66  09-16    proBNP:   Lipid Profile:   HgA1c:   TSH:   PTT - ( 15 Sep 2024 05:30 )  PTT:24.7 sec

## 2024-09-16 NOTE — PROGRESS NOTE ADULT - ATTENDING COMMENTS
72 yo M with prior HTN presents with NSTEMI, hypertensive emergency, and acute systolic HF, LVEF 15% with diastolic dysfunction. Found to have multivessel CAD without surgical targets. Has been waiting for cardiac MRI since Friday to determine viability and to evaluate if high-risk PCI may be of benefit. He is otherwise feeling well on low dose GDMT without complaints.    Labs and studies reviewed.  Discussed his diagnosis and options at length with patient and his wife.  Plan to increase Entresto to 49/51 BID tonight, stop lasix and add Farxiga 10 mg po daily tomorrow.  Continue spironolactone 25 mg daily and Toprol XL 75 mg daily.    If he is not able to get the cardiac MRI this evening, would plan for discharge home with close f/u with [facility] advanced HF team and pursue MRI as outpatient. OK from my perspective for discharge and outpatient follow-up.

## 2024-09-16 NOTE — PROGRESS NOTE ADULT - PROBLEM SELECTOR PLAN 2
warm, -JVD, euvolemic on exam.   --TTE@OSH (9/12/24): LVEF 17%, LV mod dilated, apical akinesis rest is severely hypokinetic, no LV thrombus, G3DD, mildly reduced RVSF, LA mildly dilated, mild AR, mod-severe MR  --Lactate 1.7 upon arrival, LFTs downtrending ->282->205, ->137 ->115.  --DIURESIS: continue Lasix 40mg PO daily.  --GDMT: c/w Toprol XL 50mg PO daily, Spironolactone 25mg PO daily and Entresto 24/26mg PO q 12hrs.  Holding Farxiga in setting of transaminitis.  --CORE MEASURES: strict I/Os and daily weights.  --HF team consulted (case known to Dr. Quiñones per transfer sign out). warm, -JVD, euvolemic on exam.   --TTE@OSH (9/12/24): LVEF 17%, LV mod dilated, apical akinesis rest is severely hypokinetic, no LV thrombus, G3DD, mildly reduced RVSF, LA mildly dilated, mild AR, mod-severe MR  --Lactate 1.7 upon arrival, LFTs downtrending ->282->205->121->56, ->137 ->115->89->64  --DIURESIS: continue Lasix 40mg PO daily.  --GDMT: increased Toprol XL to 75mg PO daily 2/2 ectopy on tele, Spironolactone 25mg PO daily and Entresto 24/26mg PO q 12hrs.  Start Farxiga in AM after MRI if not going for cath.  --CORE MEASURES: strict I/Os and daily weights.  --HF team consulted (case known to Dr. Quiñones per transfer sign out). warm, -JVD, euvolemic on exam.   --TTE@OSH (9/12/24): LVEF 17%, LV mod dilated, apical akinesis rest is severely hypokinetic, no LV thrombus, G3DD, mildly reduced RVSF, LA mildly dilated, mild AR, mod-severe MR  --Lactate 1.7 upon arrival, LFTs downtrending ->282->205->121->56, ->137 ->115->89->64  --DIURESIS: STOP Lasix 40mg PO daily.  --GDMT: increased Toprol XL to 75mg PO daily 2/2 ectopy on tele, Spironolactone 25mg PO daily and increase Entresto to 49/51mg PO q 12hrs.  Start Farxiga in AM after MRI if not going for cath (f/u price check first).  --CORE MEASURES: strict I/Os and daily weights.  --HF team consulted (case known to Dr. Quiñones per transfer sign out).

## 2024-09-16 NOTE — PROGRESS NOTE ADULT - ASSESSMENT
73yoM PMHx HTN and FamHx MI/HF (mother) and PAD(father) admitted to [facility] 9/12/24 for SOB and found to be AHRF 2/2 Acute CHF and HTN emergency w/ course c/b by troponemia and r/i NSTEMI resulting in tx to [facility] 9/12/24 for cardiac cath that revealed severe 3vCAD w/ subsequent tx to [facility] for CABG vs complex PCI vs medical management. TTE done showing severely reduced LVSF with EF 15-20% for which heart failure consulted.    Review of studies:   TTE 9/14/24: Severely reduced left ventricular systolic function, ejection fraction is 15-20%. Multiple segmental abnormalities exist. See findings. With echocontrast imaging, there is no evidence of left ventricular thrombus. Severely dilated left ventricular size. Grade III left ventricular diastolic dysfunction. Normal right ventricular size. Mildly reduced right ventricular systolic function. No pericardial effusion. The inferior vena cava is normal in size (<2.1cm) with abnormal inspiratory collapse (<50%) consistent with mildly elevated right atrial pressure (8, range 5-10mmHg).  TTE 9/12/24: Left ventricular cavity is moderately dilated. Left ventricular systolic function is severely decreased with an ejection fraction of 17 % by Vegas's method of disks. Apical akinesis, rest of LV is severely hypokinetic. No LV thrombus seen with Echo contrast. There is severe (grade 3) left ventricular diastolic dysfunction, with elevated left ventricular filling pressure. Normal right ventricular cavity size and mildly reduced right ventricular systolic function. Left atrium is mildly dilated. Mild aortic regurgitation. Moderate to severe mitral regurgitation. Mild tricuspid regurgitation. No pericardial effusion seen.  LHC at [facility] (9/13/24): Diffuse 3vCAD (100%mLAD, 100% D1, 100% D2, 90% OM2, 100% mRCA receives retrograde collaterals), LVEDP 35 mmHg    Recommendations:    #HFrEF  #acute systolic and diastolic CHF  -TTE showing reduced EF 15-20%, patient denies known history of HF and per patient has been asymptomatic prior to day of current admission. Patient presented with AHRF 2/2 pulmonary edema and volume overload in setting of acute decompensated HF from NSTEMI. S/p diuresis with IV lasix at OSH prior to transfer with improvement in respiratory and volume status.  Etiology: ischemic CM given severe 3vCAD on LHC with RWMAs on TTE   GDMT:   -c/w toprol 50mg qd   -c/w spironolactone 25mg qd   -c/w entresto 24-26mg BID  -Start Farxiga 10mg QD  Diuresis: patient mildly overloaded on exam today (as above presented with AHRF 2/2 pulmonary edema and volume overload now s/p IV diuresis with improvement)   -would continue with PO lasix 40mg qd   Valves: no significant valvular disease   AT: premature   Device: premature     #NSTEMI   #severe 3vCAD  -patient presenting to OSH with NSTEMI with LHC showing diffuse 3vCAD (100%mLAD, 100% D1, 100% D2, 90% OM2, 100% mRCA receives retrograde collaterals)  -case discussed with CT surgery, patient not CABG candidate   -pending possible complex PCI however likely no good targets   -c/w aspirin , Brilinta (s/p heparin) for management of NSTEMI   -high dose statin   -HST peaked    Please ensure patient has outpatient follow up with heart failure clinic upon discharge. Please email for appointment.          73yoM PMHx HTN and FamHx MI/HF (mother) and PAD(father) admitted to [facility] 9/12/24 for SOB and found to be AHRF 2/2 Acute CHF and HTN emergency w/ course c/b by troponemia and r/i NSTEMI resulting in tx to [facility] 9/12/24 for cardiac cath that revealed severe 3vCAD w/ subsequent tx to [facility] for CABG vs complex PCI vs medical management. TTE done showing severely reduced LVSF with EF 15-20% for which heart failure consulted.    Review of studies:   TTE 9/14/24: Severely reduced left ventricular systolic function, ejection fraction is 15-20%. Multiple segmental abnormalities exist. See findings. With echocontrast imaging, there is no evidence of left ventricular thrombus. Severely dilated left ventricular size. Grade III left ventricular diastolic dysfunction. Normal right ventricular size. Mildly reduced right ventricular systolic function. No pericardial effusion. The inferior vena cava is normal in size (<2.1cm) with abnormal inspiratory collapse (<50%) consistent with mildly elevated right atrial pressure (8, range 5-10mmHg).  TTE 9/12/24: Left ventricular cavity is moderately dilated. Left ventricular systolic function is severely decreased with an ejection fraction of 17 % by Vegas's method of disks. Apical akinesis, rest of LV is severely hypokinetic. No LV thrombus seen with Echo contrast. There is severe (grade 3) left ventricular diastolic dysfunction, with elevated left ventricular filling pressure. Normal right ventricular cavity size and mildly reduced right ventricular systolic function. Left atrium is mildly dilated. Mild aortic regurgitation. Moderate to severe mitral regurgitation. Mild tricuspid regurgitation. No pericardial effusion seen.  LHC at [facility] (9/13/24): Diffuse 3vCAD (100%mLAD, 100% D1, 100% D2, 90% OM2, 100% mRCA receives retrograde collaterals), LVEDP 35 mmHg

## 2024-09-16 NOTE — PROGRESS NOTE ADULT - NS ATTEND AMEND GEN_ALL_CORE FT
Mr. Urbina is a 73 year old male with history of HTN presented initially to [place] with acute decompensated heart failure with shortness of breaht, dyspnea, orthopnea and LE edema, underwent diuresis and found to have severely reduced EF and transferred to [facility] for [facility] for which patient found to have severe multivessel disease, initially transferred to [facility] for CT surgical evaluation for severe 3vCAD but deemed not a surgical candidate and undergoing further optimization and evaluation if percutaneous intervention is even an option. Pending a cMR and evaluation from HF.       Review of studies:  TTE (9/14/24): LVEF severely reduced 15% (LViDD 6.7cm) with mild RV dysfunction G3DD, aortic sclerosis without stenosis  EKG: NSR (82bpm), RBBB with inferior Q waves, anterolateral q wave    -Acute decompensated HF- Reported new HF with severely reduced EF and mild RV dysfunction, in setting of severe 3vCAD. HF consulted.   GDMT: Toprol XL 75mg, Entresto 24-26, [place] 25. Price check Farxiga.   -NSTEMI/Multivessel CAD- Pending cMR, but multiple  with bridging collaterals, poor distal targets and has been deemed not a surgical candidate, re-evaluating PCI candidacy but benefit may not be worth risk. DAPT, statin.   - NSVT- noted ectopy, short NSVT. Increase BB to 75 today (from 50) as currently euvolemic   -HTN- GDMT as above.
no JVD  clear lungs  no edema    plan on adding statin when LFTs less than 3x above normal  add SGLT2 when LFTs less than 3x above normal  plan for cardiac MRI
patient appears euvolemic  he will need a viability study  plan for repeat echo  start entresto 24/26 bid  d/w IC regarding revascularization

## 2024-09-17 ENCOUNTER — NON-APPOINTMENT (OUTPATIENT)
Age: 74
End: 2024-09-17

## 2024-09-17 ENCOUNTER — TRANSCRIPTION ENCOUNTER (OUTPATIENT)
Age: 74
End: 2024-09-17

## 2024-09-17 VITALS
DIASTOLIC BLOOD PRESSURE: 79 MMHG | OXYGEN SATURATION: 98 % | SYSTOLIC BLOOD PRESSURE: 118 MMHG | HEART RATE: 79 BPM | RESPIRATION RATE: 17 BRPM

## 2024-09-17 PROBLEM — Z00.00 ENCOUNTER FOR PREVENTIVE HEALTH EXAMINATION: Status: ACTIVE | Noted: 2024-09-17

## 2024-09-17 PROBLEM — E78.5 HYPERLIPIDEMIA, UNSPECIFIED: Chronic | Status: ACTIVE | Noted: 2024-09-13

## 2024-09-17 PROBLEM — I25.10 ATHEROSCLEROTIC HEART DISEASE OF NATIVE CORONARY ARTERY WITHOUT ANGINA PECTORIS: Chronic | Status: ACTIVE | Noted: 2024-09-13

## 2024-09-17 PROBLEM — I10 ESSENTIAL (PRIMARY) HYPERTENSION: Chronic | Status: ACTIVE | Noted: 2024-09-13

## 2024-09-17 PROBLEM — I50.22 CHRONIC SYSTOLIC (CONGESTIVE) HEART FAILURE: Chronic | Status: ACTIVE | Noted: 2024-09-13

## 2024-09-17 LAB
ALBUMIN SERPL ELPH-MCNC: 4.2 G/DL — SIGNIFICANT CHANGE UP (ref 3.3–5)
ALP SERPL-CCNC: 68 U/L — SIGNIFICANT CHANGE UP (ref 40–120)
ALT FLD-CCNC: 54 U/L — HIGH (ref 10–45)
ANION GAP SERPL CALC-SCNC: 14 MMOL/L — SIGNIFICANT CHANGE UP (ref 5–17)
AST SERPL-CCNC: 34 U/L — SIGNIFICANT CHANGE UP (ref 10–40)
BILIRUB SERPL-MCNC: 0.8 MG/DL — SIGNIFICANT CHANGE UP (ref 0.2–1.2)
BUN SERPL-MCNC: 40 MG/DL — HIGH (ref 7–23)
CALCIUM SERPL-MCNC: 9.7 MG/DL — SIGNIFICANT CHANGE UP (ref 8.4–10.5)
CHLORIDE SERPL-SCNC: 103 MMOL/L — SIGNIFICANT CHANGE UP (ref 96–108)
CO2 SERPL-SCNC: 21 MMOL/L — LOW (ref 22–31)
CREAT SERPL-MCNC: 1.32 MG/DL — HIGH (ref 0.5–1.3)
EGFR: 57 ML/MIN/1.73M2 — LOW
GLUCOSE SERPL-MCNC: 116 MG/DL — HIGH (ref 70–99)
HCT VFR BLD CALC: 48.2 % — SIGNIFICANT CHANGE UP (ref 39–50)
HGB BLD-MCNC: 16 G/DL — SIGNIFICANT CHANGE UP (ref 13–17)
MAGNESIUM SERPL-MCNC: 2.4 MG/DL — SIGNIFICANT CHANGE UP (ref 1.6–2.6)
MCHC RBC-ENTMCNC: 29.9 PG — SIGNIFICANT CHANGE UP (ref 27–34)
MCHC RBC-ENTMCNC: 33.2 GM/DL — SIGNIFICANT CHANGE UP (ref 32–36)
MCV RBC AUTO: 89.9 FL — SIGNIFICANT CHANGE UP (ref 80–100)
NRBC # BLD: 0 /100 WBCS — SIGNIFICANT CHANGE UP (ref 0–0)
PLATELET # BLD AUTO: 212 K/UL — SIGNIFICANT CHANGE UP (ref 150–400)
POTASSIUM SERPL-MCNC: 4 MMOL/L — SIGNIFICANT CHANGE UP (ref 3.5–5.3)
POTASSIUM SERPL-SCNC: 4 MMOL/L — SIGNIFICANT CHANGE UP (ref 3.5–5.3)
PROT SERPL-MCNC: 7.9 G/DL — SIGNIFICANT CHANGE UP (ref 6–8.3)
RBC # BLD: 5.36 M/UL — SIGNIFICANT CHANGE UP (ref 4.2–5.8)
RBC # FLD: 12.6 % — SIGNIFICANT CHANGE UP (ref 10.3–14.5)
SODIUM SERPL-SCNC: 138 MMOL/L — SIGNIFICANT CHANGE UP (ref 135–145)
WBC # BLD: 9.12 K/UL — SIGNIFICANT CHANGE UP (ref 3.8–10.5)
WBC # FLD AUTO: 9.12 K/UL — SIGNIFICANT CHANGE UP (ref 3.8–10.5)

## 2024-09-17 PROCEDURE — 36415 COLL VENOUS BLD VENIPUNCTURE: CPT

## 2024-09-17 PROCEDURE — 85610 PROTHROMBIN TIME: CPT

## 2024-09-17 PROCEDURE — 84484 ASSAY OF TROPONIN QUANT: CPT

## 2024-09-17 PROCEDURE — 82553 CREATINE MB FRACTION: CPT

## 2024-09-17 PROCEDURE — 71045 X-RAY EXAM CHEST 1 VIEW: CPT

## 2024-09-17 PROCEDURE — 85025 COMPLETE CBC W/AUTO DIFF WBC: CPT

## 2024-09-17 PROCEDURE — 85730 THROMBOPLASTIN TIME PARTIAL: CPT

## 2024-09-17 PROCEDURE — 99239 HOSP IP/OBS DSCHRG MGMT >30: CPT

## 2024-09-17 PROCEDURE — 82550 ASSAY OF CK (CPK): CPT

## 2024-09-17 PROCEDURE — 83605 ASSAY OF LACTIC ACID: CPT

## 2024-09-17 PROCEDURE — 83036 HEMOGLOBIN GLYCOSYLATED A1C: CPT

## 2024-09-17 PROCEDURE — 83880 ASSAY OF NATRIURETIC PEPTIDE: CPT

## 2024-09-17 PROCEDURE — 85027 COMPLETE CBC AUTOMATED: CPT

## 2024-09-17 PROCEDURE — 80053 COMPREHEN METABOLIC PANEL: CPT

## 2024-09-17 PROCEDURE — 83735 ASSAY OF MAGNESIUM: CPT

## 2024-09-17 PROCEDURE — 80061 LIPID PANEL: CPT

## 2024-09-17 PROCEDURE — C8929: CPT

## 2024-09-17 RX ORDER — SACUBITRIL AND VALSARTAN 49; 51 MG/1; MG/1
1 TABLET, FILM COATED ORAL
Qty: 0 | Refills: 0 | DISCHARGE
Start: 2024-09-17

## 2024-09-17 RX ORDER — ASPIRIN 81 MG
1 TABLET, DELAYED RELEASE (ENTERIC COATED) ORAL
Qty: 30 | Refills: 0
Start: 2024-09-17 | End: 2024-10-16

## 2024-09-17 RX ORDER — PANTOPRAZOLE SODIUM 40 MG
1 TABLET, DELAYED RELEASE (ENTERIC COATED) ORAL
Qty: 0 | Refills: 0 | DISCHARGE
Start: 2024-09-17

## 2024-09-17 RX ORDER — SACUBITRIL AND VALSARTAN 49; 51 MG/1; MG/1
1 TABLET, FILM COATED ORAL
Qty: 60 | Refills: 0
Start: 2024-09-17 | End: 2024-10-16

## 2024-09-17 RX ORDER — ASPIRIN 81 MG
1 TABLET, DELAYED RELEASE (ENTERIC COATED) ORAL
Qty: 0 | Refills: 0 | DISCHARGE
Start: 2024-09-17

## 2024-09-17 RX ORDER — SPIRONOLACTONE 25 MG/1
1 TABLET, FILM COATED ORAL
Qty: 0 | Refills: 0 | DISCHARGE
Start: 2024-09-17

## 2024-09-17 RX ORDER — SPIRONOLACTONE 25 MG/1
1 TABLET, FILM COATED ORAL
Qty: 30 | Refills: 0
Start: 2024-09-17 | End: 2024-10-16

## 2024-09-17 RX ORDER — METOPROLOL TARTRATE 100 MG/1
3 TABLET ORAL
Qty: 90 | Refills: 0
Start: 2024-09-17 | End: 2024-10-16

## 2024-09-17 RX ORDER — METOPROLOL TARTRATE 100 MG/1
3 TABLET ORAL
Qty: 0 | Refills: 0 | DISCHARGE
Start: 2024-09-17

## 2024-09-17 RX ORDER — ASPIRIN 81 MG
1 TABLET, DELAYED RELEASE (ENTERIC COATED) ORAL
Refills: 0 | DISCHARGE

## 2024-09-17 RX ORDER — METOPROLOL TARTRATE 100 MG/1
1 TABLET ORAL
Refills: 0 | DISCHARGE

## 2024-09-17 RX ORDER — PANTOPRAZOLE SODIUM 40 MG
40 TABLET, DELAYED RELEASE (ENTERIC COATED) ORAL DAILY
Refills: 0 | Status: DISCONTINUED | OUTPATIENT
Start: 2024-09-17 | End: 2024-09-17

## 2024-09-17 RX ORDER — PANTOPRAZOLE SODIUM 40 MG
1 TABLET, DELAYED RELEASE (ENTERIC COATED) ORAL
Qty: 30 | Refills: 0
Start: 2024-09-17 | End: 2024-10-16

## 2024-09-17 RX ADMIN — SACUBITRIL AND VALSARTAN 1 TABLET(S): 49; 51 TABLET, FILM COATED ORAL at 05:58

## 2024-09-17 RX ADMIN — Medication 600 MILLIGRAM(S): at 15:13

## 2024-09-17 RX ADMIN — TICAGRELOR 90 MILLIGRAM(S): 90 TABLET ORAL at 11:20

## 2024-09-17 RX ADMIN — METOPROLOL TARTRATE 75 MILLIGRAM(S): 100 TABLET ORAL at 05:59

## 2024-09-17 RX ADMIN — ACETAMINOPHEN 650 MILLIGRAM(S): 325 TABLET ORAL at 00:02

## 2024-09-17 RX ADMIN — Medication 81 MILLIGRAM(S): at 11:20

## 2024-09-17 RX ADMIN — Medication 5000 UNIT(S): at 05:58

## 2024-09-17 RX ADMIN — SPIRONOLACTONE 25 MILLIGRAM(S): 25 TABLET, FILM COATED ORAL at 05:57

## 2024-09-17 NOTE — DISCHARGE NOTE PROVIDER - ATTENDING DISCHARGE PHYSICAL EXAMINATION:
Mr. Urbina is a 73 year old male with history of HTN presented initially to [facility] with acute decompensated heart failure with shortness of breaht, dyspnea, orthopnea and LE edema, underwent diuresis and found to have severely reduced EF and transferred to [facility] for [facility] for which patient found to have severe multivessel disease, initially transferred to [facility] for CT surgical evaluation for severe 3vCAD but deemed not a surgical candidate and undergoing further optimization and evaluation if percutaneous intervention is even an option. Pending a cMR and evaluation from HF.       Review of studies:  TTE (9/14/24): LVEF severely reduced 15% (LViDD 6.7cm) with mild RV dysfunction G3DD, aortic sclerosis without stenosis  EKG: NSR (82bpm), RBBB with inferior Q waves, anterolateral q wave    -Acute decompensated HF- Reported new HF with severely reduced EF and mild RV dysfunction, in setting of severe 3vCAD. HF consulted. F/u advanced HF.   GDMT: Toprol XL 75mg, Entresto 24-26, [place] 25. Farxiga.   -NSTEMI/Multivessel CAD- Viability is pending. Given that patient has been medically managed- will continue DAPT, statin. PPI.   - NSVT- noted ectopy, short NSVT. Increase BB 75mg as currently euvolemic   -HTN- GDMT as above.

## 2024-09-17 NOTE — DISCHARGE NOTE PROVIDER - NSDCFUSCHEDAPPT_GEN_ALL_CORE_FT
Ar Rasmussen Physician [facility]  HEART[facility] [address] [place] [address]  Scheduled Appointment: 09/19/2024

## 2024-09-17 NOTE — DISCHARGE NOTE NURSING/CASE MANAGEMENT/SOCIAL WORK - PATIENT PORTAL LINK FT
You can access the FollowMyHealth Patient Portal offered by [facility] by registering at the following website: [email or web address]. By joining [facility]’s FollowMyHealth portal, you will also be able to view your health information using other applications (apps) compatible with our system.

## 2024-09-17 NOTE — DISCHARGE NOTE PROVIDER - NSDCMRMEDTOKEN_GEN_ALL_CORE_FT
Aspirin EC 81 mg oral delayed release tablet: 1 tab(s) orally once a day  atorvastatin 80 mg oral tablet: 1 tab(s) orally once a day  Farxiga 10 mg oral tablet: 1 tab(s) orally once a day  Farxiga 10 mg oral tablet: 1 tab(s) orally once a day  Lopressor 50 mg oral tablet: 1 tab(s) orally 2 times a day   Aspirin EC 81 mg oral delayed release tablet: 1 tab(s) orally once a day  Aspirin EC 81 mg oral delayed release tablet: 1 tab(s) orally once a day  atorvastatin 40 mg oral tablet: 1 tab(s) orally once a day  atorvastatin 80 mg oral tablet: 1 tab(s) orally once a day  Entresto 49 mg-51 mg oral tablet: 1 tab(s) orally 2 times a day  Farxiga 10 mg oral tablet: 1 tab(s) orally once a day  Farxiga 10 mg oral tablet: 1 tab(s) orally once a day  Lopressor 50 mg oral tablet: 1 tab(s) orally 2 times a day  metoprolol succinate 25 mg oral capsule, extended release: 3 cap(s) orally once a day  Plavix 75 mg oral tablet: 1 tab(s) orally once a day  Protonix 40 mg oral delayed release tablet: 1 tab(s) orally once a day  spironolactone 25 mg oral tablet: 1 tab(s) orally once a day   aspirin 81 mg oral delayed release tablet: 1 tab(s) orally once a day  atorvastatin 40 mg oral tablet: 1 tab(s) orally once a day (at bedtime)  clopidogrel 75 mg oral tablet: 1 tab(s) orally once a day  Farxiga 10 mg oral tablet: 1 tab(s) orally once a day  metoprolol succinate 25 mg oral tablet, extended release: 3 tab(s) orally once a day  pantoprazole 40 mg oral granule, delayed release: 1 tab(s) orally once a day  sacubitril-valsartan 49 mg-51 mg oral tablet: 1 tab(s) orally 2 times a day  spironolactone 25 mg oral tablet: 1 tab(s) orally once a day

## 2024-09-17 NOTE — CHART NOTE - NSCHARTNOTEFT_GEN_A_CORE
Discharge GDMT regimen  -Entresto 49/51mg BID  -Toprol 75mg QD  -Spironolactone 25mg QD  -Add farxiga 10mg or covered SGLT2i on discharge    Please schedule follow-up with Anika and Dr. Valencia.     Patient should obtain outpatient CMR.

## 2024-09-17 NOTE — DISCHARGE NOTE PROVIDER - CARE PROVIDER_API CALL
Ar Rasmussen [place] [address]  [place], [place], [address]  Phone: [phone]  Fax: (   )    -  Scheduled Appointment: 09/19/2024 02:00 PM   Ar Rasmussen  [facility] [address] [place], [place], [address]  Phone: [phone]  Fax: (   )    -  Scheduled Appointment: 09/19/2024 02:00 PM    Angel Valencia  [facility]  [address] [place] [id number]  Phone: [phone]  Fax: [phone]  Follow Up Time:

## 2024-09-17 NOTE — DISCHARGE NOTE NURSING/CASE MANAGEMENT/SOCIAL WORK - NSDCPEFALRISK_GEN_ALL_CORE
For information on Fall & Injury Prevention, visit: [email or web address] OR  [email or web address] OR  https://www.cdc.gov/steadi/patient.html

## 2024-09-17 NOTE — DISCHARGE NOTE PROVIDER - HOSPITAL COURSE
73yoM PMHx HTN and FamHx MI/HF (mother) and PAD(father) admitted to [facility] 9/12/24 for SOB and found to be AHRF 2/2 Acute CHF and HTN emergency w/ course c/b by troponemia and r/i NSTEMI resulting in tx to [facility] 9/12/24 for cardiac cath that revealed severe 3vCAD w/ subsequent tx to [facility] for CABG vs complex PCI vs medical management. Per attending to attending d/w CTS Dr. Almonte, patient deemed not a surgical candidate as he has no appropriate targets. TTE done showing severely reduced LVSF with EF 15-20% for which heart failure consulted.    Problem: HFrEF (heart failure with reduced ejection fraction).   Etiology: ischemic CM given severe 3vCAD on [facility] with RWMAs on TTE   GDMT: c/w toprol 75mg qd, c/w spironolactone 25mg qd, increase to entresto 49/51mg BID, start covered SGLT2i before discharge  Diuresis: Stop diuretics  Valves: no significant valvular disease   AT: premature   Device: premature  Complete cardiac MRI outpatient. Follow up with heart failure clinic at [facility] on____.     Problem: NSTEMI (non-ST elevation myocardial infarction).   Severe 3vCAD: presenting to OSH with NSTEMI with [facility] showing diffuse 3vCAD (100%mLAD, 100% D1, 100% D2, 90% OM2, 100% mRCA receives retrograde collaterals)  Case discussed with CT surgery, patient not CABG candidate   Pending possible complex PCI however likely no good targets   c/w aspirin, Brilinta (s/p heparin) for management of N-STEMI   high dose statin   HST peaked  CXR: Frontal examination of the chest demonstrates the heart to be within normal limits in transverse diameter. No acute infiltrates. Dextroscoliosis thoracic spine.    Problem: HTN (hypertension)  BP better controlled now.    Discharge Medications:   Asprin EC 81mg oral once a day   Atorvastatin 80mg oral once a day   Farxiga 10mg oral once a day   Lopressor 50 mg oral once a day   Furosemide 40mg oral once a day   metoprolol succinate ER 50mg once a day   Sacubitril 24mg/valsartan 26mg one tablet twice a day  Spironolactone 25mg oral once a day  Ticagrelor 90 mg oral every 12 hours      73yoM PMHx HTN and FamHx MI/HF (mother) and PAD(father) admitted to [facility] 9/12/24 for SOB and found to be AHRF 2/2 Acute CHF and HTN emergency w/ course c/b by troponemia and r/i NSTEMI resulting in tx to [facility] 9/12/24 for cardiac cath that revealed severe 3vCAD w/ subsequent tx to [facility] for CABG vs complex PCI vs medical management. Per attending to attending d/w CTS Dr. Almonte, patient deemed not a surgical candidate as he has no appropriate targets. TTE done showing severely reduced LVSF with EF 15-20% for which heart failure consulted.    Problem: HFrEF (heart failure with reduced ejection fraction).   Etiology: ischemic CM given severe 3vCAD on [facility] with RWMAs on TTE   GDMT: c/w toprol 75mg qd, c/w spironolactone 25mg qd, entresto 49/51mg BID, Farxiga 10mg   Diuresis: Stop diuretics  Valves: no significant valvular disease   AT: premature   Device: premature  Complete cardiac MRI outpatient. Follow up with heart failure clinic with Dr. Valencia (Dr. Grace will contact Dr. Valencia directly to make an appointment as the office can only schedule patient for end of November).   No cardiac rehab at this time as patient is pending cMRI to assess for viability for potential PCI.     Problem: NSTEMI (non-ST elevation myocardial infarction).   Severe 3vCAD: presenting to OSH with NSTEMI with [facility] showing diffuse 3vCAD (100%mLAD, 100% D1, 100% D2, 90% OM2, 100% mRCA receives retrograde collaterals)  Case discussed with CT surgery, patient not CABG candidate as there are no distal targets  2/2 diffuse disease   Pending possible complex PCI after cMRI   c/w aspirin, Plavix (loaded before dc) (s/p heparin) for management of N-STEMI   high dose statin-- Atorvastatin 40mg   HST peaked at 7500   CXR: Frontal examination of the chest demonstrates the heart to be within normal limits in transverse diameter. No acute infiltrates. Dextroscoliosis thoracic spine.    Discharge Medications:   aspirin 81 mg oral delayed release tablet: 1 tab(s) orally once a day  atorvastatin 40 mg oral tablet: 1 tab(s) orally once a day (at bedtime)  clopidogrel 75 mg oral tablet: 1 tab(s) orally once a day  Farxiga 10 mg oral tablet: 1 tab(s) orally once a day  metoprolol succinate 25 mg oral tablet, extended release: 3 tab(s) orally once a day  pantoprazole 40 mg oral granule, delayed release: 1 tab(s) orally once a day  sacubitril-valsartan 49 mg-51 mg oral tablet: 1 tab(s) orally 2 times a day  spironolactone 25 mg oral tablet: 1 tab(s) orally once a day    Pt is asymptomatic at this time and denies chest pain, SOB, MENJIVRA, palpitations, dizziness, LOC, N/V, diaphoresis, orthopnea/PND, and leg swelling. Pt able to ambulate and void without complication. VSS. Labs and telemetry reviewed. Pt is a candidate for discharge per Dr. Sanchez. Pt given appropriate discharge instructions, pt states they have an appropriate amount of their previous home meds unchanged from this visit at home, and any new medications were sent to their pharmacy. Pt instructed to f/u with Valery and Dr. Valencia.

## 2024-09-17 NOTE — DISCHARGE NOTE PROVIDER - CARE PROVIDERS DIRECT ADDRESSES
,DirectAddress_Unknown ,DirectAddress_Unknown,[email or web address]

## 2024-09-17 NOTE — DISCHARGE NOTE PROVIDER - PROVIDER TOKENS
FREE:[LAST:[Valery],FIRST:[Ar],PHONE:[phone]],FAX:[(   )    -],ADDRESS:[[facility] [address] [place], [place], [address]],SCHEDULEDAPPT:[09/19/2024],SCHEDULEDAPPTTIME:[02:00 PM]] FREE:[LAST:[Valery],FIRST:[Ar],PHONE:[phone]:[(   )    -],ADDRESS:[[facility] [place] [address] [place], [address]],SCHEDULEDAPPT:[09/19/2024],SCHEDULEDAPPTTIME:[02:00 PM]],PROVIDER:[TOKEN:[[id number]]]

## 2024-09-17 NOTE — DISCHARGE NOTE PROVIDER - NSDCCPCAREPLAN_GEN_ALL_CORE_FT
PRINCIPAL DISCHARGE DIAGNOSIS  Diagnosis: HFrEF (heart failure with reduced ejection fraction)  Assessment and Plan of Treatment: You have a weak heart, also known as Congestive Heart Failure (CHF). Heart failure is a condition in which the heart does not pump or fill with blood well. As a result, the heart lags behind in its job of moving blood throughout the body. This can lead to symptoms such as swelling, trouble breathing, and feeling tired. Your Ejection Fraction (EF) is  17%, a normal EF is 55-60%.  -Please continue the following medicaiton regimen for heart failure management: Entresto 49/51mg twice daily, Spironolactone 25mg daily, Metoprolol Succinate 75mg daily and Farxiga 10mg daily.   -Avoid drinking more than 1.5L of fluid daily and maintain a low salt diet (max 2grams daily).  -Please weigh yourself daily, for any significant increases in daily weight of 2lbs/day or 5lbs/week with associated swelling in the legs or abdomen and/or shortness of breath, please call your doctor or go to the emergency room.  -Follow up with Dr. Angel Valencia, the office will call you once they have scheduled an appointment.         SECONDARY DISCHARGE DIAGNOSES  Diagnosis: CAD (coronary artery disease)  Assessment and Plan of Treatment: You were found to have blockages in the arteries of your heart, also known as Coronary Artery Disease.   PLEASE CONTINUE ASPIRIN 81MG DAILY AND PLAVIX 75MG DAILY. DO NOT STOP THESE MEDICATIONS FOR ANY REASON UNLESS INSTRUCTED BY YOUR CARDIOLOGIST.   Please start Atorvastatin 40mg at bedtime to keep your cholesterol low. High cholesterol contributes to heart disease.  Avoid strenuous activity or heavy lifting anything more than 5lbs for the next five days. Do not take a bath or swim for the next five days; you may shower. For any bleeding or hematoma formation (hardened blood collection under the skin) at the access site of your right groin please hold pressure and go to the emergency room. Please follow up with Dr. Rasmussen on Thursday 9/19 at 2 pm.  For recurrent chest pain, please call your doctor or go to the emergency room.      Diagnosis: HTN (hypertension)  Assessment and Plan of Treatment: Please continue the following medcaitons to keep your blood pressure controlled: Entresto twice daily, Spironolactone daily, Metoprolol daily. For blood pressure that is too high or too low please see your doctor or go to the emergency room as necessary.

## 2024-09-19 ENCOUNTER — NON-APPOINTMENT (OUTPATIENT)
Age: 74
End: 2024-09-19

## 2024-09-19 ENCOUNTER — APPOINTMENT (OUTPATIENT)
Dept: HEART AND VASCULAR | Facility: CLINIC | Age: 74
End: 2024-09-19
Payer: COMMERCIAL

## 2024-09-19 VITALS — DIASTOLIC BLOOD PRESSURE: 80 MMHG | SYSTOLIC BLOOD PRESSURE: 106 MMHG | HEART RATE: 80 BPM | RESPIRATION RATE: 16 BRPM

## 2024-09-19 DIAGNOSIS — Z78.9 OTHER SPECIFIED HEALTH STATUS: ICD-10-CM

## 2024-09-19 DIAGNOSIS — Z82.49 FAMILY HISTORY OF ISCHEMIC HEART DISEASE AND OTHER DISEASES OF THE CIRCULATORY SYSTEM: ICD-10-CM

## 2024-09-19 DIAGNOSIS — I10 ESSENTIAL (PRIMARY) HYPERTENSION: ICD-10-CM

## 2024-09-19 DIAGNOSIS — I25.5 ISCHEMIC CARDIOMYOPATHY: ICD-10-CM

## 2024-09-19 DIAGNOSIS — I50.20 UNSPECIFIED SYSTOLIC (CONGESTIVE) HEART FAILURE: ICD-10-CM

## 2024-09-19 DIAGNOSIS — E78.5 HYPERLIPIDEMIA, UNSPECIFIED: ICD-10-CM

## 2024-09-19 PROCEDURE — 93000 ELECTROCARDIOGRAM COMPLETE: CPT

## 2024-09-19 PROCEDURE — G2211 COMPLEX E/M VISIT ADD ON: CPT | Mod: NC

## 2024-09-19 PROCEDURE — 99215 OFFICE O/P EST HI 40 MIN: CPT | Mod: 25

## 2024-09-19 RX ORDER — CLOPIDOGREL 75 MG/1
75 TABLET, FILM COATED ORAL
Refills: 0 | Status: ACTIVE | COMMUNITY

## 2024-09-19 RX ORDER — METOPROLOL SUCCINATE 25 MG/1
25 TABLET, EXTENDED RELEASE ORAL
Refills: 0 | Status: ACTIVE | COMMUNITY

## 2024-09-19 RX ORDER — ASPIRIN ENTERIC COATED TABLETS 81 MG 81 MG/1
81 TABLET, DELAYED RELEASE ORAL
Refills: 0 | Status: ACTIVE | COMMUNITY

## 2024-09-19 RX ORDER — SPIRONOLACTONE 25 MG/1
25 TABLET ORAL
Refills: 0 | Status: ACTIVE | COMMUNITY

## 2024-09-19 RX ORDER — SACUBITRIL AND VALSARTAN 49; 51 MG/1; MG/1
49-51 TABLET, FILM COATED ORAL
Refills: 0 | Status: ACTIVE | COMMUNITY

## 2024-09-19 RX ORDER — ATORVASTATIN CALCIUM 40 MG/1
40 TABLET, FILM COATED ORAL
Refills: 0 | Status: ACTIVE | COMMUNITY

## 2024-09-19 NOTE — REASON FOR VISIT
[Cardiac Failure] : cardiac failure [Coronary Artery Disease] : coronary artery disease [Spouse] : spouse [Family Member] : family member [FreeTextEntry1] : This 73 year old man comes in for follow up after being hospitalized for new onset CHF and CAD. He went to [facility] c/o acute SOB. he was treated for acute systolic CHF and subsequently ruled in for NSTEMI. He was transferred to [facility] and had cardiac cath on 9/13/24 which revealed severe diffuse three vessel CAD with elevated LVEDP 35. An echocardiogram revealed severely reduced LV function with global hypokinesis and apical akinesis, EF 15-20%. He was transferred to [facility] for CABG but was not deemed to be a surgical candidate based on anatomy and viability. He was discharged home on 9/17.  Today he denies chest pain, SOB, MENJIVAR, orthopnea, PND, palpitations or syncope.  He reports being compliant with all his medication.

## 2024-09-19 NOTE — DISCUSSION/SUMMARY
[FreeTextEntry1] : EKG: NSR, RBBB, old IWMI  Impression: Chronic systolic CHF and ischemic CMP under good medical control. Patient is not a surgical candidate for CABG. He is to see Dr. Valencia of the Heart Failure service. Will arrange appointment in 3 months to get EP consult for possible AICD (no significant ectopy seen on monitor in hospital).  He is to follow up in 2 months. Currently he is scheduled for THR on January 6, 2025 - will assess in December.

## 2024-09-23 DIAGNOSIS — I25.10 ATHEROSCLEROTIC HEART DISEASE OF NATIVE CORONARY ARTERY WITHOUT ANGINA PECTORIS: ICD-10-CM

## 2024-09-23 DIAGNOSIS — I11.0 HYPERTENSIVE HEART DISEASE WITH HEART FAILURE: ICD-10-CM

## 2024-09-23 DIAGNOSIS — I21.4 NON-ST ELEVATION (NSTEMI) MYOCARDIAL INFARCTION: ICD-10-CM

## 2024-09-23 DIAGNOSIS — E78.5 HYPERLIPIDEMIA, UNSPECIFIED: ICD-10-CM

## 2024-09-23 DIAGNOSIS — I16.1 HYPERTENSIVE EMERGENCY: ICD-10-CM

## 2024-09-23 DIAGNOSIS — N17.9 ACUTE KIDNEY FAILURE, UNSPECIFIED: ICD-10-CM

## 2024-09-23 DIAGNOSIS — I47.19 OTHER SUPRAVENTRICULAR TACHYCARDIA: ICD-10-CM

## 2024-09-24 ENCOUNTER — APPOINTMENT (OUTPATIENT)
Dept: HEART FAILURE | Facility: CLINIC | Age: 74
End: 2024-09-24
Payer: COMMERCIAL

## 2024-09-24 VITALS
HEART RATE: 68 BPM | WEIGHT: 200 LBS | SYSTOLIC BLOOD PRESSURE: 124 MMHG | OXYGEN SATURATION: 98 % | DIASTOLIC BLOOD PRESSURE: 87 MMHG | HEIGHT: 68 IN | BODY MASS INDEX: 30.31 KG/M2

## 2024-09-24 DIAGNOSIS — I25.10 ATHEROSCLEROTIC HEART DISEASE OF NATIVE CORONARY ARTERY W/OUT ANGINA PECTORIS: ICD-10-CM

## 2024-09-24 PROCEDURE — 99215 OFFICE O/P EST HI 40 MIN: CPT

## 2024-09-24 PROCEDURE — 99205 OFFICE O/P NEW HI 60 MIN: CPT

## 2024-09-24 RX ORDER — DAPAGLIFLOZIN 10 MG/1
10 TABLET, FILM COATED ORAL DAILY
Qty: 30 | Refills: 3 | Status: ACTIVE | COMMUNITY

## 2024-09-25 LAB
ALBUMIN SERPL ELPH-MCNC: 4.9 G/DL
ALP BLD-CCNC: 73 U/L
ALT SERPL-CCNC: 28 U/L
ANION GAP SERPL CALC-SCNC: 18 MMOL/L
AST SERPL-CCNC: 18 U/L
BILIRUB SERPL-MCNC: 0.5 MG/DL
BUN SERPL-MCNC: 30 MG/DL
CALCIUM SERPL-MCNC: 10.2 MG/DL
CHLORIDE SERPL-SCNC: 100 MMOL/L
CO2 SERPL-SCNC: 21 MMOL/L
CREAT SERPL-MCNC: 1.4 MG/DL
EGFR: 53 ML/MIN/1.73M2
GLUCOSE SERPL-MCNC: 97 MG/DL
POTASSIUM SERPL-SCNC: 5.2 MMOL/L
PROT SERPL-MCNC: 7.9 G/DL
SODIUM SERPL-SCNC: 138 MMOL/L

## 2024-09-25 NOTE — HISTORY OF PRESENT ILLNESS
[FreeTextEntry1] : Mr Urbina is 73-year-old male with past medical history HFrEF LVEF 17%, 3VD not a candidate for surgery due to poor targets,  hypertension. Pt presents today post hospital discharge follow- up for his cardiomyopathy.   His course started when he presented as a transfer from [facility] on 9/12/2024  to [facility] for a cardiac catheterization. Patient initially presented to [facility] on 9/12/2024 after having sudden onset of severe "gurgling" in the chest associated with dyspnea. He was noted to have bilateral vascular congestion on chest x-ray. He met criteria on arrival there for htn emergency. He was placed on bipap for associated acute resp failure w hypoxia and was admitted for further cardiac evaluation. He was managed medically with IV lasix, nitro drip, Brilinta load, aspirin, high intensity statin, beta blocker, sglt2, hep drip. Troponin steadily began rising and reached peak in the 700s. He was evaluated by cardiology who made plans to transfer him to [facility] for further cardiac intervention. In [facility] Troponin continued to rise 2183 --->3246. TTE at [place] showed an EF of 17%, apical akinesis, rest of LV is severely hypokinetic. severe (grade 3)LVDD, nml RV S + mildly reduced RVSF, LA mildly dilated, Mild AR, Moderate to severe MR, Mild TR. [facility] showed 3VD pt was to be transferred to [facility] for surgical evaluation.   At [facility] pt was deemed not to be a surgical candidate due to poor targets and was to have a viability study for possibly high-risk PCI.  Pt was unable to have it done in the hospital and is to complete the study as on outpatient.  Pt presents today with his wife and daughter. states he has been feeling well since being discharged from the hospital. He has been doing housework he doesn't necessarily go for walks, but he functions well, he goes up and down the stairs and denies SOB/MENJIVAR. He hasn't felt any chest pain or palpitation since hospitalization. His blood pressure and weight has been stable. His appetite has been good he denies constipation. He denies syncope, LH/dizziness, orthopnea, PND, abdominal discomfort, and LE edema. He has been limiting fluid and sodium in his diet and taking his medications as directed.

## 2024-09-25 NOTE — ASSESSMENT
[FreeTextEntry1] : Mr Urbina is 73-year-old male with past medical history HFrEF LVEF 17%, 3VD not a candidate for surgery due to poor targets,  hypertension. Pt presents today post hospital discharge follow- up for his cardiomyopathy.  ICM HFrEF (heart failure with reduced ejection fraction)  - ACC/AHA stage C NYHA Class II-III - Ischemic CM given severe 3vCAD on LHC with RWMAs on TTE, non revascularized - Euvolemic no need for diuretics at this time  - Continue Metoprolol succinate 75mg PO QD, Resting HE 64 - Continue Entresto 49/51mg BID - Continue Spironolactone 25 mg PO QD  - Continue Farxiga 10 mg QD  - will get labs today and if stable K and renal function will increase entresto  - Take your weight +BP daily and report weight gain of 3 lbs in one day or 5lbs in one week that is unrelieved by diuretic therapy. Notify office of HR <60 or BP systolic <90 for medication optimization. Limit your sodium intake and Keep fluids between 1.5-2L daily.  NSTEMI (non-ST elevation myocardial infarction).  Severe 3vCAD -patient presenting to [facility] with NSTEMI with LHC showing diffuse 3vCAD (100%mLAD, 100% D1, 100% D2, 90% OM2, 100% mRCA receives retrograde collaterals) - CT surgery was consulted and patient not CABG candidate - Pending cMRI for viability to help guide if PCI is warrented for any of his CAD - Continue Atorvastatin 40 mg PO QD  - Continue Plavix 75 mg PO QD  - Continue Aspirin 81 mg PO QD    HTN (hypertension). - medication regimen as stated above   RTC in 4 weeks

## 2024-09-25 NOTE — ASSESSMENT
[FreeTextEntry1] : Mr Urbina is 73-year-old male with past medical history HFrEF LVEF 17%, 3VD not a candidate for surgery due to poor targets,  hypertension. Pt presents today post hospital discharge follow- up for his cardiomyopathy.  ICM HFrEF (heart failure with reduced ejection fraction)  - ACC/AHA stage C NYHA Class II-III - Ischemic CM given severe 3vCAD on LHC with RWMAs on TTE, non revascularized - Euvolemic no need for diuretics at this time  - Continue Metoprolol succinate 75mg PO QD, Resting HE 64 - Continue Entresto 49/51mg BID - Continue Spironolactone 25 mg PO QD  - Continue Farxiga 10 mg QD  - will get labs today and if stable K and renal function will increase entresto  - Take your weight +BP daily and report weight gain of 3 lbs in one day or 5lbs in one week that is unrelieved by diuretic therapy. Notify office of HR <60 or BP systolic <90 for medication optimization. Limit your sodium intake and Keep fluids between 1.5-2L daily.  NSTEMI (non-ST elevation myocardial infarction).  Severe 3vCAD -patient presenting to [facility] with NSTEMI with LHC showing diffuse 3vCAD (100%mLAD, 100% D1, 100% D2, 90% OM2, 100% mRCA receives retrograde collaterals) - CT surgery was consulted and patient not CABG candidate - Pending cMRI for viability to help guide if PCI is warrented for any of his CAD - Continue Atorvastatin 40 mg PO QD  - Continue Plavix 75 mg PO QD  - Continue Aspirin 81 mg PO QD    HTN (hypertension). - medication regimen as stated above   RTC in 4 weeks

## 2024-09-25 NOTE — CARDIOLOGY SUMMARY
[de-identified] : LVIDd 6.70 cm LVEF 15-20%, Multiple segmental abnormalities exist. With echocontrast imaging, there is no evidence of left ventricular thrombus.  Grade III LVDD,  Nml RV S + Mildly reduced RVSF, Normal atria. No AS,  [de-identified] : [facility] with NSTEMI with LHC showing diffuse 3vCAD (100%mLAD, 100% D1, 100% D2, 90% OM2, 100% mRCA receives retrograde collaterals)

## 2024-09-25 NOTE — HISTORY OF PRESENT ILLNESS
[FreeTextEntry1] : Mr Urbina is 73-year-old male with past medical history HFrEF LVEF 17%, 3VD not a candidate for surgery due to poor targets,  hypertension. Pt presents today post hospital discharge follow- up for his cardiomyopathy.   His course started when he presented as a transfer from [facility] on 9/12/2024  to [facility] for a cardiac catheterization. Patient initially presented to [facility] on 9/12/2024 after having sudden onset of severe "gurgling" in the chest associated with dyspnea. He was noted to have bilateral vascular congestion on chest x-ray. He met criteria on arrival there for htn emergency. He was placed on bipap for associated acute resp failure w hypoxia and was admitted for further cardiac evaluation. He was managed medically with IV lasix, nitro drip, Brilinta load, aspirin, high intensity statin, beta blocker, sglt2, hep drip. Troponin steadily began rising and reached peak in the 700s. He was evaluated by cardiology who made plans to transfer him to [facility] for further cardiac intervention. In [facility] Troponin continued to rise 2183 --->3246. TTE at [place] showed an EF of 17%, apical akinesis, rest of LV is severely hypokinetic. severe (grade 3)LVDD, nml RV S + mildly reduced RVSF, LA mildly dilated, Mild AR, Moderate to severe MR, Mild TR. [facility] showed 3VD pt was to be transferred to [facility] for surgical evaluation.   At [facility] pt was deemed not to be a surgical candidate due to poor targets and was to have a viability study for possibly high-risk PCI.  Pt was unable to have it done in the hospital and is to complete the study as on outpatient.  Pt presents today with his wife and daughter. states he has been feeling well since being discharged from the hospital. He has been doing housework he doesn't necessarily go for walks, but he functions well, he goes up and down the stairs and denies SOB/MENJIVAR. He hasn't felt any chest pain or palpitation since hospitalization. His blood pressure and weight has been stable. His appetite has been good he denies constipation. He denies syncope, LH/dizziness, orthopnea, PND, abdominal discomfort, and LE edema. He has been limiting fluid and sodium in his diet and taking his medications as directed.

## 2024-09-25 NOTE — CARDIOLOGY SUMMARY
[de-identified] : LVIDd 6.70 cm LVEF 15-20%, Multiple segmental abnormalities exist. With echocontrast imaging, there is no evidence of left ventricular thrombus.  Grade III LVDD,  Nml RV S + Mildly reduced RVSF, Normal atria. No AS,  [de-identified] : [facility] with NSTEMI with LHC showing diffuse 3vCAD (100%mLAD, 100% D1, 100% D2, 90% OM2, 100% mRCA receives retrograde collaterals)

## 2024-09-25 NOTE — ASSESSMENT
[FreeTextEntry1] : Mr Urbina is 73-year-old male with past medical history HFrEF LVEF 17%, 3VD not a candidate for surgery due to poor targets,  hypertension. Pt presents today post hospital discharge follow- up for his cardiomyopathy.  ICM HFrEF (heart failure with reduced ejection fraction)  - ACC/AHA stage C NYHA Class II-III - Ischemic CM given severe 3vCAD on LHC with RWMAs on TTE, non revascularized - Euvolemic no need for diuretics at this time  - Continue Metoprolol succinate 75mg PO QD, Resting HE 64 - Continue Entresto 49/51mg BID - Continue Spironolactone 25 mg PO QD  - Continue Farxiga 10 mg QD  - will get labs today and if stable K and renal function will increase entresto  - Take your weight +BP daily and report weight gain of 3 lbs in one day or 5lbs in one week that is unrelieved by diuretic therapy. Notify office of HR <60 or BP systolic <90 for medication optimization. Limit your sodium intake and Keep fluids between 1.5-2L daily.  NSTEMI (non-ST elevation myocardial infarction).  Severe 3vCAD -patient presenting to [facility] with NSTEMI with LHC showing diffuse 3vCAD (100%mLAD, 100% D1, 100% D2, 90% OM2, 100% mRCA receives retrograde collaterals) - CT surgery was consulted and patient not CABG candidate - Pending cMRI for viability to help guide if PCI is warrented for any of his CAD - Continue Atorvastatin 40 mg PO QD  - Continue Plavix 75 mg PO QD  - Continue Aspirin 81 mg PO QD    HTN (hypertension). - medication regimen as stated above   RTC in 4 weeks

## 2024-09-25 NOTE — HISTORY OF PRESENT ILLNESS
[FreeTextEntry1] : Mr Urbina is 73-year-old male with past medical history HFrEF LVEF 17%, 3VD not a candidate for surgery due to poor targets,  hypertension. Pt presents today post hospital discharge follow- up for his cardiomyopathy.   His course started when he presented as a transfer from [facility] on 9/12/2024  to [facility] for a cardiac catheterization. Patient initially presented to [facility] on 9/12/2024 after having sudden onset of severe "gurgling" in the chest associated with dyspnea. He was noted to have bilateral vascular congestion on chest x-ray. He met criteria on arrival there for htn emergency. He was placed on bipap for associated acute resp failure w hypoxia and was admitted for further cardiac evaluation. He was managed medically with IV lasix, nitro drip, Brilinta load, aspirin, high intensity statin, beta blocker, sglt2, hep drip. Troponin steadily began rising and reached peak in the 700s. He was evaluated by cardiology who made plans to transfer him to [facility] for further cardiac intervention. In [facility] Troponin continued to rise 2183 --->3246. TTE at [place] showed an EF of 17%, apical akinesis, rest of LV is severely hypokinetic. severe (grade 3)LVDD, nml RV S + mildly reduced RVSF, LA mildly dilated, Mild AR, Moderate to severe MR, Mild TR. [facility] showed 3VD pt was to be transferred to [facility] for surgical evaluation.   At [facility] pt was deemed not to be a surgical candidate due to poor targets and was to have a viability study for possibly high-risk PCI.  Pt was unable to have it done in the hospital and is to complete the study as on outpatient.  Pt presents today with his wife and daughter. states he has been feeling well since being discharged from the hospital. He has been doing housework he doesn't necessarily go for walks, but he functions well, he goes up and down the stairs and denies SOB/MENJIVAR. He hasn't felt any chest pain or palpitation since hospitalization. His blood pressure and weight has been stable. His appetite has been good he denies constipation. He denies syncope, LH/dizziness, orthopnea, PND, abdominal discomfort, and LE edema. He has been limiting fluid and sodium in his diet and taking his medications as directed.

## 2024-09-25 NOTE — PHYSICAL EXAM
[Well Developed] : well developed [Well Nourished] : well nourished [Normal S1, S2] : normal S1, S2 [Clear Lung Fields] : clear lung fields [Good Air Entry] : good air entry [Soft] : abdomen soft [Non Tender] : non-tender [Normal Gait] : normal gait [No Edema] : no edema [Moves all extremities] : moves all extremities [No Focal Deficits] : no focal deficits [Alert and Oriented] : alert and oriented [de-identified] : ~6-8 JVP [id number]

## 2024-09-25 NOTE — PHYSICAL EXAM
[Well Developed] : well developed [Well Nourished] : well nourished [Normal S1, S2] : normal S1, S2 [Clear Lung Fields] : clear lung fields [Good Air Entry] : good air entry [Soft] : abdomen soft [Non Tender] : non-tender [Normal Gait] : normal gait [No Edema] : no edema [Moves all extremities] : moves all extremities [No Focal Deficits] : no focal deficits [Alert and Oriented] : alert and oriented [de-identified] : ~6-8 JVP [id number]

## 2024-09-25 NOTE — PHYSICAL EXAM
[Well Developed] : well developed [Well Nourished] : well nourished [Normal S1, S2] : normal S1, S2 [Clear Lung Fields] : clear lung fields [Good Air Entry] : good air entry [Soft] : abdomen soft [Non Tender] : non-tender [Normal Gait] : normal gait [No Edema] : no edema [Moves all extremities] : moves all extremities [No Focal Deficits] : no focal deficits [Alert and Oriented] : alert and oriented [de-identified] : ~6-8 JVP [id number]

## 2024-09-25 NOTE — CARDIOLOGY SUMMARY
[de-identified] : LVIDd 6.70 cm LVEF 15-20%, Multiple segmental abnormalities exist. With echocontrast imaging, there is no evidence of left ventricular thrombus.  Grade III LVDD,  Nml RV S + Mildly reduced RVSF, Normal atria. No AS,  [de-identified] : [facility] with NSTEMI with LHC showing diffuse 3vCAD (100%mLAD, 100% D1, 100% D2, 90% OM2, 100% mRCA receives retrograde collaterals)

## 2024-10-01 RX ORDER — PANTOPRAZOLE 40 MG/1
40 TABLET, DELAYED RELEASE ORAL DAILY
Qty: 90 | Refills: 3 | Status: ACTIVE | COMMUNITY
Start: 1900-01-01 | End: 1900-01-01

## 2024-10-02 ENCOUNTER — RESULT REVIEW (OUTPATIENT)
Age: 74
End: 2024-10-02

## 2024-10-02 ENCOUNTER — NON-APPOINTMENT (OUTPATIENT)
Age: 74
End: 2024-10-02

## 2024-10-10 ENCOUNTER — APPOINTMENT (OUTPATIENT)
Dept: HEART FAILURE | Facility: CLINIC | Age: 74
End: 2024-10-10
Payer: COMMERCIAL

## 2024-10-10 VITALS
HEIGHT: 68 IN | TEMPERATURE: 98.7 F | HEART RATE: 67 BPM | OXYGEN SATURATION: 98 % | WEIGHT: 200 LBS | BODY MASS INDEX: 30.31 KG/M2 | SYSTOLIC BLOOD PRESSURE: 110 MMHG | DIASTOLIC BLOOD PRESSURE: 70 MMHG

## 2024-10-10 DIAGNOSIS — I23.6 THROMBOSIS OF ATRIUM, AURICULAR APPENDAGE, AND VENTRICLE AS CURRENT COMPLICATIONS FOLLOWING ACUTE MYOCARDIAL INFARCTION: ICD-10-CM

## 2024-10-10 PROCEDURE — 99214 OFFICE O/P EST MOD 30 MIN: CPT

## 2024-10-11 LAB
ALBUMIN SERPL ELPH-MCNC: 4.7 G/DL
ALP BLD-CCNC: 78 U/L
ALT SERPL-CCNC: 28 U/L
ANION GAP SERPL CALC-SCNC: 15 MMOL/L
AST SERPL-CCNC: 18 U/L
BILIRUB SERPL-MCNC: 0.6 MG/DL
BUN SERPL-MCNC: 23 MG/DL
CALCIUM SERPL-MCNC: 10.2 MG/DL
CHLORIDE SERPL-SCNC: 105 MMOL/L
CO2 SERPL-SCNC: 22 MMOL/L
CREAT SERPL-MCNC: 1.31 MG/DL
EGFR: 57 ML/MIN/1.73M2
GLUCOSE SERPL-MCNC: 113 MG/DL
NT-PROBNP SERPL-MCNC: 1986 PG/ML
POTASSIUM SERPL-SCNC: 4.9 MMOL/L
PROT SERPL-MCNC: 7.7 G/DL
SODIUM SERPL-SCNC: 142 MMOL/L

## 2024-10-15 ENCOUNTER — RX RENEWAL (OUTPATIENT)
Age: 74
End: 2024-10-15

## 2024-10-24 RX ORDER — EMPAGLIFLOZIN 10 MG/1
10 TABLET, FILM COATED ORAL DAILY
Qty: 90 | Refills: 3 | Status: ACTIVE | COMMUNITY
Start: 2024-10-24 | End: 1900-01-01

## 2024-10-28 ENCOUNTER — NON-APPOINTMENT (OUTPATIENT)
Age: 74
End: 2024-10-28

## 2024-11-07 ENCOUNTER — APPOINTMENT (OUTPATIENT)
Dept: HEART AND VASCULAR | Facility: CLINIC | Age: 74
End: 2024-11-07
Payer: COMMERCIAL

## 2024-11-07 DIAGNOSIS — I50.20 UNSPECIFIED SYSTOLIC (CONGESTIVE) HEART FAILURE: ICD-10-CM

## 2024-11-07 DIAGNOSIS — I25.5 ISCHEMIC CARDIOMYOPATHY: ICD-10-CM

## 2024-11-07 PROCEDURE — 99215 OFFICE O/P EST HI 40 MIN: CPT

## 2024-11-07 PROCEDURE — G2211 COMPLEX E/M VISIT ADD ON: CPT | Mod: NC

## 2024-11-11 VITALS — DIASTOLIC BLOOD PRESSURE: 74 MMHG | RESPIRATION RATE: 16 BRPM | HEART RATE: 72 BPM | SYSTOLIC BLOOD PRESSURE: 110 MMHG

## 2024-11-11 VITALS — WEIGHT: 201 LBS | BODY MASS INDEX: 30.56 KG/M2

## 2024-11-19 RX ORDER — METOPROLOL SUCCINATE 50 MG/1
50 TABLET, EXTENDED RELEASE ORAL
Qty: 30 | Refills: 3 | Status: ACTIVE | COMMUNITY
Start: 2024-11-19 | End: 1900-01-01

## 2024-11-26 ENCOUNTER — APPOINTMENT (OUTPATIENT)
Dept: HEART FAILURE | Facility: CLINIC | Age: 74
End: 2024-11-26

## 2024-11-26 ENCOUNTER — NON-APPOINTMENT (OUTPATIENT)
Age: 74
End: 2024-11-26

## 2024-11-26 ENCOUNTER — TRANSCRIPTION ENCOUNTER (OUTPATIENT)
Age: 74
End: 2024-11-26

## 2024-11-26 VITALS
HEART RATE: 71 BPM | SYSTOLIC BLOOD PRESSURE: 122 MMHG | HEIGHT: 68 IN | BODY MASS INDEX: 29.7 KG/M2 | WEIGHT: 196 LBS | DIASTOLIC BLOOD PRESSURE: 82 MMHG | OXYGEN SATURATION: 98 %

## 2024-11-26 PROCEDURE — 99214 OFFICE O/P EST MOD 30 MIN: CPT

## 2024-11-27 LAB
ALBUMIN SERPL ELPH-MCNC: 4.6 G/DL
ALP BLD-CCNC: 59 U/L
ALT SERPL-CCNC: 20 U/L
ANION GAP SERPL CALC-SCNC: 15 MMOL/L
AST SERPL-CCNC: 20 U/L
BILIRUB SERPL-MCNC: 0.6 MG/DL
BUN SERPL-MCNC: 21 MG/DL
CALCIUM SERPL-MCNC: 9.7 MG/DL
CHLORIDE SERPL-SCNC: 106 MMOL/L
CO2 SERPL-SCNC: 20 MMOL/L
CREAT SERPL-MCNC: 1.09 MG/DL
EGFR: 72 ML/MIN/1.73M2
GLUCOSE SERPL-MCNC: 140 MG/DL
NT-PROBNP SERPL-MCNC: 2190 PG/ML
POTASSIUM SERPL-SCNC: 4.6 MMOL/L
PROT SERPL-MCNC: 7.3 G/DL
SODIUM SERPL-SCNC: 141 MMOL/L

## 2025-01-24 ENCOUNTER — NON-APPOINTMENT (OUTPATIENT)
Age: 75
End: 2025-01-24

## 2025-02-27 ENCOUNTER — NON-APPOINTMENT (OUTPATIENT)
Age: 75
End: 2025-02-27

## 2025-02-27 ENCOUNTER — APPOINTMENT (OUTPATIENT)
Dept: HEART FAILURE | Facility: CLINIC | Age: 75
End: 2025-02-27
Payer: COMMERCIAL

## 2025-02-27 VITALS — DIASTOLIC BLOOD PRESSURE: 80 MMHG | SYSTOLIC BLOOD PRESSURE: 118 MMHG

## 2025-02-27 VITALS
BODY MASS INDEX: 28.79 KG/M2 | WEIGHT: 190 LBS | TEMPERATURE: 97.9 F | SYSTOLIC BLOOD PRESSURE: 148 MMHG | HEART RATE: 81 BPM | HEIGHT: 68 IN | DIASTOLIC BLOOD PRESSURE: 98 MMHG | OXYGEN SATURATION: 98 %

## 2025-02-27 PROCEDURE — 93000 ELECTROCARDIOGRAM COMPLETE: CPT

## 2025-02-27 PROCEDURE — 99215 OFFICE O/P EST HI 40 MIN: CPT | Mod: 25

## 2025-02-28 LAB
ALBUMIN SERPL ELPH-MCNC: 4.8 G/DL
ALP BLD-CCNC: 66 U/L
ALT SERPL-CCNC: 24 U/L
ANION GAP SERPL CALC-SCNC: 16 MMOL/L
AST SERPL-CCNC: 21 U/L
BILIRUB SERPL-MCNC: 0.7 MG/DL
BUN SERPL-MCNC: 20 MG/DL
CALCIUM SERPL-MCNC: 10 MG/DL
CHLORIDE SERPL-SCNC: 104 MMOL/L
CO2 SERPL-SCNC: 20 MMOL/L
CREAT SERPL-MCNC: 1.14 MG/DL
EGFR: 68 ML/MIN/1.73M2
GLUCOSE SERPL-MCNC: 98 MG/DL
NT-PROBNP SERPL-MCNC: 1932 PG/ML
POTASSIUM SERPL-SCNC: 4.5 MMOL/L
PROT SERPL-MCNC: 7.7 G/DL
SODIUM SERPL-SCNC: 140 MMOL/L

## 2025-03-13 ENCOUNTER — APPOINTMENT (OUTPATIENT)
Dept: HEART AND VASCULAR | Facility: CLINIC | Age: 75
End: 2025-03-13

## 2025-03-26 ENCOUNTER — NON-APPOINTMENT (OUTPATIENT)
Age: 75
End: 2025-03-26

## 2025-03-26 ENCOUNTER — APPOINTMENT (OUTPATIENT)
Dept: HEART AND VASCULAR | Facility: CLINIC | Age: 75
End: 2025-03-26
Payer: COMMERCIAL

## 2025-03-26 VITALS
DIASTOLIC BLOOD PRESSURE: 66 MMHG | OXYGEN SATURATION: 98 % | BODY MASS INDEX: 28.64 KG/M2 | HEART RATE: 70 BPM | WEIGHT: 189 LBS | SYSTOLIC BLOOD PRESSURE: 112 MMHG | HEIGHT: 68 IN | TEMPERATURE: 98.3 F | RESPIRATION RATE: 16 BRPM

## 2025-03-26 DIAGNOSIS — I25.2 OLD MYOCARDIAL INFARCTION: ICD-10-CM

## 2025-03-26 DIAGNOSIS — I23.6 THROMBOSIS OF ATRIUM, AURICULAR APPENDAGE, AND VENTRICLE AS CURRENT COMPLICATIONS FOLLOWING ACUTE MYOCARDIAL INFARCTION: ICD-10-CM

## 2025-03-26 DIAGNOSIS — I25.5 ISCHEMIC CARDIOMYOPATHY: ICD-10-CM

## 2025-03-26 DIAGNOSIS — I50.20 UNSPECIFIED SYSTOLIC (CONGESTIVE) HEART FAILURE: ICD-10-CM

## 2025-03-26 PROCEDURE — 93000 ELECTROCARDIOGRAM COMPLETE: CPT

## 2025-03-26 PROCEDURE — 99205 OFFICE O/P NEW HI 60 MIN: CPT | Mod: 25

## 2025-05-29 ENCOUNTER — APPOINTMENT (OUTPATIENT)
Dept: HEART FAILURE | Facility: CLINIC | Age: 75
End: 2025-05-29